# Patient Record
Sex: FEMALE | Employment: FULL TIME | URBAN - METROPOLITAN AREA
[De-identification: names, ages, dates, MRNs, and addresses within clinical notes are randomized per-mention and may not be internally consistent; named-entity substitution may affect disease eponyms.]

---

## 2017-05-30 ENCOUNTER — TELEPHONE (OUTPATIENT)
Dept: INTERNAL MEDICINE CLINIC | Facility: CLINIC | Age: 57
End: 2017-05-30

## 2017-05-30 NOTE — TELEPHONE ENCOUNTER
Patient called had episode this AM of pop in head and dizziness. States subsided after getting up. No further dizziness today, denies and head pain, or vision disturbance. Will call ENT for f/u MRI sooner as has acoustic neuroma.   States feeling fine no

## 2017-05-31 NOTE — TELEPHONE ENCOUNTER
Called patient spoke with Dr. Aguilar Rodriguez will  order for MRI and schedule to see if any changes in acoustic neuroma. She said he was not to startled told her it could be a number of things.     If any severe pain, or visual disturbance or extreme dizzi

## 2017-09-20 ENCOUNTER — OFFICE VISIT (OUTPATIENT)
Dept: INTERNAL MEDICINE CLINIC | Facility: CLINIC | Age: 57
End: 2017-09-20

## 2017-09-20 VITALS
TEMPERATURE: 98 F | HEIGHT: 69 IN | DIASTOLIC BLOOD PRESSURE: 72 MMHG | SYSTOLIC BLOOD PRESSURE: 109 MMHG | HEART RATE: 71 BPM | WEIGHT: 164 LBS | BODY MASS INDEX: 24.29 KG/M2

## 2017-09-20 DIAGNOSIS — Z00.00 ROUTINE GENERAL MEDICAL EXAMINATION AT A HEALTH CARE FACILITY: Primary | ICD-10-CM

## 2017-09-20 DIAGNOSIS — Z12.31 VISIT FOR SCREENING MAMMOGRAM: ICD-10-CM

## 2017-09-20 DIAGNOSIS — D32.1 SPINAL MENINGIOMA (HCC): ICD-10-CM

## 2017-09-20 DIAGNOSIS — D33.3 ACOUSTIC NEUROMA (HCC): ICD-10-CM

## 2017-09-20 LAB
CHOLEST SERPL-MCNC: 260 MG/DL (ref 110–200)
HDLC SERPL-MCNC: 81 MG/DL
LDLC SERPL CALC-MCNC: 151 MG/DL (ref 0–99)
NONHDLC SERPL-MCNC: 179 MG/DL
TRIGL SERPL-MCNC: 139 MG/DL (ref 1–149)

## 2017-09-20 PROCEDURE — 99396 PREV VISIT EST AGE 40-64: CPT | Performed by: INTERNAL MEDICINE

## 2017-09-20 PROCEDURE — 36415 COLL VENOUS BLD VENIPUNCTURE: CPT | Performed by: INTERNAL MEDICINE

## 2017-09-20 RX ORDER — DIAZEPAM 5 MG/1
5 TABLET ORAL
COMMUNITY

## 2017-09-20 RX ORDER — VENLAFAXINE 37.5 MG/1
37.5 TABLET ORAL 2 TIMES DAILY
Qty: 60 TABLET | Refills: 3 | Status: SHIPPED | OUTPATIENT
Start: 2017-09-20 | End: 2017-09-29 | Stop reason: ALTCHOICE

## 2017-09-20 NOTE — PATIENT INSTRUCTIONS
ASSESSMENT AND PLAN:   Angelia Mata is a 56year old female who presents for a complete physical exam. Pap and pelvic done. Self breast exam explained.    Health maintenance: patient is due for pap, mamm, colon   Pt' s weight is Body mass index is 24.22

## 2017-09-20 NOTE — PROGRESS NOTES
HPI:   Florencio Euceda is a 56year old female who presents for a complete physical exam. Symptoms: is menopausal.   Patient complains of needs PE.      Wt Readings from Last 3 Encounters:  09/20/17 : 164 lb (74.4 kg)  08/05/16 : 163 lb (73.9 kg)  10/26/15 throat. Eyes Negative Eye pain and vision changes. Respiratory Negative Cough, dyspnea and wheezing. Cardio Negative Chest pain, claudication, edema and irregular heartbeat/palpitations.    GI Negative Abdominal pain, blood in stool, constipation, ishan Pulses - Dorsalis pedis: Normal. Bruits - Carotids: Absent. Extremity Normal No edema. No varicosities. Abdomen Normal Inspection normal, BS active.  No abdominal tenderness or masses palpable   Genitourinary Normal External genitalia, urethra - Normal

## 2017-09-21 LAB — HPV I/H RISK 1 DNA SPEC QL NAA+PROBE: NEGATIVE

## 2017-09-22 LAB
LAST PAP RESULT: NORMAL
PAP HISTORY (OTHER THAN LAST PAP): NORMAL

## 2017-09-29 ENCOUNTER — OFFICE VISIT (OUTPATIENT)
Dept: DERMATOLOGY CLINIC | Facility: CLINIC | Age: 57
End: 2017-09-29

## 2017-09-29 VITALS — WEIGHT: 163 LBS | BODY MASS INDEX: 24.14 KG/M2 | HEIGHT: 69 IN

## 2017-09-29 DIAGNOSIS — D23.60 BENIGN NEOPLASM OF SKIN OF UPPER LIMB, INCLUDING SHOULDER, UNSPECIFIED LATERALITY: ICD-10-CM

## 2017-09-29 DIAGNOSIS — L82.0 INFLAMED SEBORRHEIC KERATOSIS: ICD-10-CM

## 2017-09-29 DIAGNOSIS — D23.4 BENIGN NEOPLASM OF SCALP AND SKIN OF NECK: ICD-10-CM

## 2017-09-29 DIAGNOSIS — D48.5 NEOPLASM OF UNCERTAIN BEHAVIOR OF SKIN: Primary | ICD-10-CM

## 2017-09-29 DIAGNOSIS — D23.30 BENIGN NEOPLASM OF SKIN OF FACE: ICD-10-CM

## 2017-09-29 DIAGNOSIS — D22.9 MULTIPLE NEVI: ICD-10-CM

## 2017-09-29 DIAGNOSIS — D23.5 BENIGN NEOPLASM OF SKIN OF TRUNK, EXCEPT SCROTUM: ICD-10-CM

## 2017-09-29 PROCEDURE — 88305 TISSUE EXAM BY PATHOLOGIST: CPT | Performed by: DERMATOLOGY

## 2017-09-29 PROCEDURE — 99212 OFFICE O/P EST SF 10 MIN: CPT | Performed by: DERMATOLOGY

## 2017-09-29 PROCEDURE — 11100 BIOPSY OF SKIN LESION: CPT | Performed by: DERMATOLOGY

## 2017-09-29 PROCEDURE — 99213 OFFICE O/P EST LOW 20 MIN: CPT | Performed by: DERMATOLOGY

## 2017-09-29 RX ORDER — DESOXIMETASONE 2.5 MG/G
CREAM TOPICAL
Qty: 60 G | Refills: 3 | Status: SHIPPED | OUTPATIENT
Start: 2017-09-29 | End: 2018-10-01

## 2017-10-03 NOTE — PROGRESS NOTES
9/29/2017 derm path report results log in log book, letter generated as per [employer] and mailed to pt.

## 2017-10-03 NOTE — PROGRESS NOTES
The pathology report from last visit showed   benign, irritated seborrheic keratosis from left preauricular cheek. Please log in test results, send biopsy results letter. Pt to rtc 1 year or prn.

## 2017-10-09 NOTE — PROGRESS NOTES
Silas Riley is a 56year old female. HPI:     CC:  Patient presents with:  Moles: left side of face by ear ,has changed over the last month ,patient denies itch ,pain ,or bleeding,suface is now rough .  No personal or family HX of SC  Psoriasis: Shravan Kidd education: N/A  Number of children: N/A     Occupational History  None on file     Social History Main Topics   Smoking status: Never Smoker    Smokeless tobacco: Never Used    Alcohol use No    Comment: minimal    Drug use: No    Sexual activity: Not on f lesions examined with dermoscopy benign-appearing patterns. Waxy tannish keratotic papules scattered, cherry-red vascular papules scattered. See map today's date for lesions noted .     Minimal psoriasiform patches elbows some postauricularly, papule cherry angiomas:  Reassurance regarding other benign skin lesions. Signs and symptoms of skin cancer, ABCDE's of melanoma discussed with patient. Sunscreen use, sun protection, self exams reviewed.   Followup as noted RTC routine checkup 6 mos - one year or

## 2017-11-11 ENCOUNTER — HOSPITAL ENCOUNTER (OUTPATIENT)
Dept: MAMMOGRAPHY | Facility: HOSPITAL | Age: 57
Discharge: HOME | End: 2017-11-11
Attending: INTERNAL MEDICINE
Payer: COMMERCIAL

## 2017-11-11 DIAGNOSIS — Z12.31 VISIT FOR SCREENING MAMMOGRAM: ICD-10-CM

## 2017-11-11 PROCEDURE — 77067 SCR MAMMO BI INCL CAD: CPT | Performed by: INTERNAL MEDICINE

## 2018-10-02 RX ORDER — DESOXIMETASONE 2.5 MG/G
CREAM TOPICAL
Qty: 60 G | Refills: 0 | Status: SHIPPED | OUTPATIENT
Start: 2018-10-02 | End: 2021-02-11

## 2018-11-07 ENCOUNTER — TELEPHONE (OUTPATIENT)
Dept: INTERNAL MEDICINE CLINIC | Facility: CLINIC | Age: 58
End: 2018-11-07

## 2018-11-07 NOTE — TELEPHONE ENCOUNTER
Patient calling is former patient of Dr José Pillai patient has appointment scheduled for January 31st is requesting orders for labs as needed and mammogram      Please call patient when written.

## 2018-11-07 NOTE — TELEPHONE ENCOUNTER
New patients will be based upon exam.  There might be other things that we need to draw and I do not hesitate to poke twice.   So it is better if she comes in and if there is any other labs that we need to do in addition to regular routine labs would rather

## 2019-01-31 ENCOUNTER — OFFICE VISIT (OUTPATIENT)
Dept: INTERNAL MEDICINE CLINIC | Facility: CLINIC | Age: 59
End: 2019-01-31
Payer: COMMERCIAL

## 2019-01-31 VITALS
WEIGHT: 166 LBS | HEART RATE: 63 BPM | TEMPERATURE: 98 F | HEIGHT: 69 IN | DIASTOLIC BLOOD PRESSURE: 74 MMHG | OXYGEN SATURATION: 98 % | BODY MASS INDEX: 24.59 KG/M2 | SYSTOLIC BLOOD PRESSURE: 114 MMHG

## 2019-01-31 DIAGNOSIS — Z00.00 ANNUAL PHYSICAL EXAM: Primary | ICD-10-CM

## 2019-01-31 DIAGNOSIS — Z12.39 BREAST CANCER SCREENING: ICD-10-CM

## 2019-01-31 DIAGNOSIS — R82.90 ABNORMAL URINE: ICD-10-CM

## 2019-01-31 LAB
BILIRUB UR QL: NEGATIVE
COLOR UR: YELLOW
GLUCOSE UR-MCNC: NEGATIVE MG/DL
KETONES UR-MCNC: NEGATIVE MG/DL
NITRITE UR QL STRIP.AUTO: NEGATIVE
PH UR: 6 [PH] (ref 5–8)
PROT UR-MCNC: NEGATIVE MG/DL
RBC #/AREA URNS AUTO: 1 /HPF
SP GR UR STRIP: 1.01 (ref 1–1.03)
UROBILINOGEN UR STRIP-ACNC: <2
VIT C UR-MCNC: NEGATIVE MG/DL
WBC #/AREA URNS AUTO: 1 /HPF

## 2019-01-31 PROCEDURE — 99396 PREV VISIT EST AGE 40-64: CPT | Performed by: INTERNAL MEDICINE

## 2019-01-31 NOTE — PATIENT INSTRUCTIONS
ASSESSMENT/PLAN:   Annual physical exam  (primary encounter diagnosis) Check urine. Breast cancer screening Check  mammogram. Continue self breast exam every month.      Orders Placed This Encounter      POC Urinalysis, Automated Dip without microscop

## 2019-01-31 NOTE — PROGRESS NOTES
HPI:    Patient ID: Rolly Healy is a 66year old female. Has F/U Dr. Felix Estrada neuroSx. In 7-19 after MRI to be re-done. Rolly Healy is a 66year old female who presents for a complete physical exam. New to me. PCP has left practice.    HPI: BILT 1.1 08/19/2016 08:10 AM    TSH 1.56 08/19/2016 08:10 AM        Lab Results   Component Value Date/Time    CHOLEST 260 (H) 09/20/2017 12:13 PM    HDL 81 09/20/2017 12:13 PM    TRIG 139 09/20/2017 12:13 PM     (H) 09/20/2017 12:13 PM    Snow Caffeine Concern: Yes          Coffee - 3 cups per day         Pt has a pacemaker: No        Pt has a defibrillator: No        Reaction to local anesthetic: No        Hx of Pap: all Paps normal            Review of Systems   Constitutional: Negative. Psychiatric/Behavioral: Negative for agitation, behavioral problems, confusion, decreased concentration, dysphoric mood, hallucinations, self-injury, sleep disturbance and suicidal ideas. The patient is not nervous/anxious and is not hyperactive.     All ot Right Ear: Tympanic membrane, external ear and ear canal normal. No lacerations. No drainage, swelling or tenderness. No foreign bodies. No mastoid tenderness.  Tympanic membrane is not injected, not scarred, not perforated, not erythematous, not retracted Eyes: Conjunctivae, EOM and lids are normal. Pupils are equal, round, and reactive to light. Right eye exhibits no chemosis, no discharge, no exudate and no hordeolum. No foreign body present in the right eye.  Left eye exhibits no chemosis, no discharge, n Abdominal: Soft. Bowel sounds are normal. She exhibits no distension, no fluid wave, no ascites and no mass. There is no hepatosplenomegaly, splenomegaly or hepatomegaly. There is no tenderness.  There is no rigidity, no rebound, no guarding and no CVA tend Requested Prescriptions      No prescriptions requested or ordered in this encounter       Imaging & Referrals:  [facility] SCREENING BILAT (CPT=77067)        [facility]#[id number]

## 2019-02-25 ENCOUNTER — HOSPITAL ENCOUNTER (OUTPATIENT)
Dept: MAMMOGRAPHY | Facility: HOSPITAL | Age: 59
Discharge: HOME | End: 2019-02-25
Attending: INTERNAL MEDICINE
Payer: COMMERCIAL

## 2019-02-25 DIAGNOSIS — Z12.39 BREAST CANCER SCREENING: ICD-10-CM

## 2019-02-25 PROCEDURE — 77067 SCR MAMMO BI INCL CAD: CPT | Performed by: INTERNAL MEDICINE

## 2019-02-25 PROCEDURE — 77063 BREAST TOMOSYNTHESIS BI: CPT | Performed by: INTERNAL MEDICINE

## 2019-03-23 ENCOUNTER — TELEPHONE (OUTPATIENT)
Dept: INTERNAL MEDICINE CLINIC | Facility: CLINIC | Age: 59
End: 2019-03-23

## 2019-03-23 DIAGNOSIS — Z00.00 ADULT GENERAL MEDICAL EXAM: Primary | ICD-10-CM

## 2019-03-23 NOTE — TELEPHONE ENCOUNTER
Patient called requesting orders for labs tests taht she is due for.   Patient went on Saturday and there were no orders in the system

## 2019-03-29 ENCOUNTER — LAB ENCOUNTER (OUTPATIENT)
Dept: LAB | Facility: HOSPITAL | Age: 59
End: 2019-03-29
Attending: INTERNAL MEDICINE
Payer: COMMERCIAL

## 2019-03-29 DIAGNOSIS — Z00.00 ADULT GENERAL MEDICAL EXAM: ICD-10-CM

## 2019-03-29 LAB
ALBUMIN SERPL-MCNC: 4.1 G/DL (ref 3.4–5)
ALBUMIN/GLOB SERPL: 1.2 {RATIO} (ref 1–2)
ALP LIVER SERPL-CCNC: 64 U/L (ref 46–118)
ALT SERPL-CCNC: 25 U/L (ref 13–56)
ANION GAP SERPL CALC-SCNC: 5 MMOL/L (ref 0–18)
AST SERPL-CCNC: 11 U/L (ref 15–37)
BASOPHILS # BLD AUTO: 0.04 X10(3) UL (ref 0–0.2)
BASOPHILS NFR BLD AUTO: 0.8 %
BILIRUB SERPL-MCNC: 0.6 MG/DL (ref 0.1–2)
BUN BLD-MCNC: 13 MG/DL (ref 7–18)
BUN/CREAT SERPL: 18.6 (ref 10–20)
CALCIUM BLD-MCNC: 9.6 MG/DL (ref 8.5–10.1)
CHLORIDE SERPL-SCNC: 109 MMOL/L (ref 98–107)
CHOLEST SMN-MCNC: 250 MG/DL (ref ?–200)
CO2 SERPL-SCNC: 27 MMOL/L (ref 21–32)
CREAT BLD-MCNC: 0.7 MG/DL (ref 0.55–1.02)
DEPRECATED RDW RBC AUTO: 41.2 FL (ref 35.1–46.3)
EOSINOPHIL # BLD AUTO: 0.11 X10(3) UL (ref 0–0.7)
EOSINOPHIL NFR BLD AUTO: 2.3 %
ERYTHROCYTE [DISTWIDTH] IN BLOOD BY AUTOMATED COUNT: 12 % (ref 11–15)
GLOBULIN PLAS-MCNC: 3.4 G/DL (ref 2.8–4.4)
GLUCOSE BLD-MCNC: 99 MG/DL (ref 70–99)
HCT VFR BLD AUTO: 45.7 % (ref 35–48)
HDLC SERPL-MCNC: 77 MG/DL (ref 40–59)
HGB BLD-MCNC: 14.9 G/DL (ref 12–16)
IMM GRANULOCYTES # BLD AUTO: 0.01 X10(3) UL (ref 0–1)
IMM GRANULOCYTES NFR BLD: 0.2 %
LDLC SERPL CALC-MCNC: 157 MG/DL (ref ?–100)
LYMPHOCYTES # BLD AUTO: 1.9 X10(3) UL (ref 1–4)
LYMPHOCYTES NFR BLD AUTO: 39.3 %
M PROTEIN MFR SERPL ELPH: 7.5 G/DL (ref 6.4–8.2)
MCH RBC QN AUTO: 30.5 PG (ref 26–34)
MCHC RBC AUTO-ENTMCNC: 32.6 G/DL (ref 31–37)
MCV RBC AUTO: 93.5 FL (ref 80–100)
MONOCYTES # BLD AUTO: 0.47 X10(3) UL (ref 0.1–1)
MONOCYTES NFR BLD AUTO: 9.7 %
NEUTROPHILS # BLD AUTO: 2.3 X10 (3) UL (ref 1.5–7.7)
NEUTROPHILS # BLD AUTO: 2.3 X10(3) UL (ref 1.5–7.7)
NEUTROPHILS NFR BLD AUTO: 47.7 %
NONHDLC SERPL-MCNC: 173 MG/DL (ref ?–130)
OSMOLALITY SERPL CALC.SUM OF ELEC: 292 MOSM/KG (ref 275–295)
PLATELET # BLD AUTO: 357 10(3)UL (ref 150–450)
POTASSIUM SERPL-SCNC: 4.5 MMOL/L (ref 3.5–5.1)
RBC # BLD AUTO: 4.89 X10(6)UL (ref 3.8–5.3)
SODIUM SERPL-SCNC: 141 MMOL/L (ref 136–145)
TRIGL SERPL-MCNC: 81 MG/DL (ref 30–149)
TSI SER-ACNC: 2.16 MIU/ML (ref 0.36–3.74)
VLDLC SERPL CALC-MCNC: 16 MG/DL (ref 0–30)
WBC # BLD AUTO: 4.8 X10(3) UL (ref 4–11)

## 2019-03-29 PROCEDURE — 80061 LIPID PANEL: CPT

## 2019-03-29 PROCEDURE — 36415 COLL VENOUS BLD VENIPUNCTURE: CPT

## 2019-03-29 PROCEDURE — 80053 COMPREHEN METABOLIC PANEL: CPT

## 2019-03-29 PROCEDURE — 85025 COMPLETE CBC W/AUTO DIFF WBC: CPT

## 2019-03-29 PROCEDURE — 84443 ASSAY THYROID STIM HORMONE: CPT

## 2019-03-31 PROBLEM — E78.00 HIGH CHOLESTEROL: Status: ACTIVE | Noted: 2019-03-31

## 2019-11-20 ENCOUNTER — OFFICE VISIT (OUTPATIENT)
Dept: OPHTHALMOLOGY | Facility: CLINIC | Age: 59
End: 2019-11-20
Payer: COMMERCIAL

## 2019-11-20 DIAGNOSIS — H43.393 FLOATER, VITREOUS, BILATERAL: ICD-10-CM

## 2019-11-20 DIAGNOSIS — H20.811: Primary | ICD-10-CM

## 2019-11-20 DIAGNOSIS — H25.13 AGE-RELATED NUCLEAR CATARACT OF BOTH EYES: ICD-10-CM

## 2019-11-20 PROCEDURE — 92004 COMPRE OPH EXAM NEW PT 1/>: CPT | Performed by: OPHTHALMOLOGY

## 2019-11-20 PROCEDURE — 92015 DETERMINE REFRACTIVE STATE: CPT | Performed by: OPHTHALMOLOGY

## 2019-11-20 NOTE — ASSESSMENT & PLAN NOTE
Right eye much greater than left eye. There is no evidence of retinal pathology. All signs and symptoms of retinal detachment/tears explained in detail.     Patient instructed to call the office if they experience increase in floaters, increase in flashe

## 2019-11-20 NOTE — ASSESSMENT & PLAN NOTE
Discussed cataract in the right eye. Glasses improve vision to 20/20- in the right eye. No surgery is indicated at this time, but will follow yearly. RX for separate distance, reading or progressive glasses per patient's choice.

## 2019-11-20 NOTE — PATIENT INSTRUCTIONS
Fuchs' heterochromic iridocyclitis, right  Stable; no treatment. Stressed importance of yearly eye exams. Age-related nuclear cataract of both eyes  Discussed cataract in the right eye. Glasses improve vision to 20/20- in the right eye.   No surgery

## 2019-11-20 NOTE — PROGRESS NOTES
Danielle Grace is a 64year old female. HPI:     HPI     Pt was last seen in 2013 by CALVIN. Pt complains of blurred vision OD at distance and near. She is wearing OTC reading glasses but would like an updated Rx. Last edited by Jose Nayak OCarlos ORTIZ. Visual Acuity (Snellen - Linear)       Right Left    Dist sc 20/50 20/20    Dist ph sc 20/30     Near cc 20/60 20/20          Tonometry ([facility]care, 2:43 PM)       Right Left    Pressure 10 10          Pupils       Pupils    Right PERRL    Left PERRL +0.25 075 20/20- +2.50 20/20    Left -0.25 Sphere  20/20 +2.50 20/20    Type:  Progressive bifocal                 ASSESSMENT/PLAN:     Diagnoses and Plan:     Fuchs' heterochromic iridocyclitis, right  Stable; no treatment.    Stressed importance of yearly

## 2020-02-19 ENCOUNTER — OFFICE VISIT (OUTPATIENT)
Dept: OPHTHALMOLOGY | Facility: CLINIC | Age: 60
End: 2020-02-19
Payer: COMMERCIAL

## 2020-02-19 ENCOUNTER — TELEPHONE (OUTPATIENT)
Dept: OPHTHALMOLOGY | Facility: CLINIC | Age: 60
End: 2020-02-19

## 2020-02-19 ENCOUNTER — TELEPHONE (OUTPATIENT)
Dept: INTERNAL MEDICINE CLINIC | Facility: CLINIC | Age: 60
End: 2020-02-19

## 2020-02-19 DIAGNOSIS — H25.13 AGE-RELATED NUCLEAR CATARACT OF BOTH EYES: ICD-10-CM

## 2020-02-19 DIAGNOSIS — H33.001 MACULA-ON RHEGMATOGENOUS RETINAL DETACHMENT OF RIGHT EYE: Primary | ICD-10-CM

## 2020-02-19 DIAGNOSIS — H20.811: ICD-10-CM

## 2020-02-19 PROBLEM — H33.21 RIGHT RETINAL DETACHMENT: Status: ACTIVE | Noted: 2020-02-19

## 2020-02-19 PROCEDURE — 92014 COMPRE OPH EXAM EST PT 1/>: CPT | Performed by: OPHTHALMOLOGY

## 2020-02-19 NOTE — PATIENT INSTRUCTIONS
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 Faustina Armando for evaluation and treatment. Appointment was scheduled today  with Dr. Juanito Block at    .    Location is the [facility] location- [address] If the retina has torn but has not yet detached from the wall of the eye, treatment can create an adhesive scar around the retinal tear. This is like stapling around a hole in the wallpaper to keep it from coming off the wall.  This can be done with laser t © 2630-8975 [employer] [address]. [address] [facility], [facility] [place]. All rights reserved. This information is not intended as a substitute for professional medical care. Always follow your healthcare professional's instructions.

## 2020-02-19 NOTE — TELEPHONE ENCOUNTER
Linette/ [facility] states patient is scheduled for an emergency surgery tomorrow 02/20/2020 with Dr. Toño Mancera. [facility] is requesting medical clearance from Dr. Jossy Alvarado.     Fax# [phone]

## 2020-02-19 NOTE — TELEPHONE ENCOUNTER
Dr. Mariya Barone is on vacation. I do not know this patient. From reviewing her chart I see that she is not on any medication, she has no history of high blood pressure cholesterol or diabetes.   If she needs urgent surgery , she cannot wait until she comes madina

## 2020-02-19 NOTE — PROGRESS NOTES
Angelia Mata is a 64year old female. HPI:     HPI     Pt called today stating that she has a blind spot in her vision nasally towards the bottom when looking straight ahead since this morning.  Pt states at noon today the blind spot doubled in size w for: Constitutional, Gastrointestinal, Neurological, Skin, Genitourinary, Musculoskeletal, HENT, Endocrine, Cardiovascular, Respiratory, Psychiatric, Allergic/Imm, Heme/Lymph    Last edited by Cathalene Essex, O.T. on 2/19/2020  2:17 PM. (History) Rosemary Llanos at    . Location is the [facility] location- [address] [place] [address].   Phone # [phone] (orange parking lot)   Patient should bring photo ID, insurance cards, they should be aware that their eyes will be

## 2020-02-19 NOTE — ASSESSMENT & PLAN NOTE
Refer to Dr. Srikanth Oliveira for evaluation and treatment. Appointment was scheduled today  with Dr. Nani Gonzales at    . Location is the [place] [address] location- [address] [place] [address].   Phone # [phone] (orange parkin

## 2020-02-20 NOTE — TELEPHONE ENCOUNTER
I called the [facility] and the patient is having surgery soon. They are asking a note with the information below faxed to them. I pended the note with what was written.     Please sign off and have the office staff fax to     Thank y

## 2020-02-27 PROBLEM — H33.21 RIGHT RETINAL DETACHMENT: Status: ACTIVE | Noted: 2020-02-27

## 2020-02-27 PROBLEM — H43.811 VITREOUS DETACHMENT OF RIGHT EYE: Status: ACTIVE | Noted: 2020-02-27

## 2020-03-11 PROBLEM — Z12.39 BREAST CANCER SCREENING: Status: ACTIVE | Noted: 2020-03-11

## 2020-03-11 PROBLEM — Z71.85 VACCINE COUNSELING: Status: ACTIVE | Noted: 2020-03-11

## 2020-03-11 PROBLEM — Z00.00 ADULT GENERAL MEDICAL EXAMINATION: Status: ACTIVE | Noted: 2020-03-11

## 2020-03-12 ENCOUNTER — TELEPHONE (OUTPATIENT)
Dept: INTERNAL MEDICINE CLINIC | Facility: CLINIC | Age: 60
End: 2020-03-12

## 2020-03-12 DIAGNOSIS — Z00.00 ADULT GENERAL MEDICAL EXAMINATION: ICD-10-CM

## 2020-03-12 DIAGNOSIS — E78.00 HIGH CHOLESTEROL: ICD-10-CM

## 2020-03-12 DIAGNOSIS — Z12.39 BREAST CANCER SCREENING: Primary | ICD-10-CM

## 2020-03-14 ENCOUNTER — HOSPITAL ENCOUNTER (OUTPATIENT)
Dept: MAMMOGRAPHY | Facility: HOSPITAL | Age: 60
Discharge: HOME | End: 2020-03-14
Attending: INTERNAL MEDICINE
Payer: COMMERCIAL

## 2020-03-14 ENCOUNTER — APPOINTMENT (OUTPATIENT)
Dept: LAB | Facility: HOSPITAL | Age: 60
End: 2020-03-14
Attending: INTERNAL MEDICINE
Payer: COMMERCIAL

## 2020-03-14 DIAGNOSIS — Z00.00 ADULT GENERAL MEDICAL EXAMINATION: ICD-10-CM

## 2020-03-14 DIAGNOSIS — Z12.39 BREAST CANCER SCREENING: ICD-10-CM

## 2020-03-14 DIAGNOSIS — E78.00 HIGH CHOLESTEROL: ICD-10-CM

## 2020-03-14 LAB
ALBUMIN SERPL-MCNC: 4.1 G/DL (ref 3.4–5)
ALBUMIN/GLOB SERPL: 1.3 {RATIO} (ref 1–2)
ALP LIVER SERPL-CCNC: 66 U/L (ref 46–118)
ALT SERPL-CCNC: 23 U/L (ref 13–56)
ANION GAP SERPL CALC-SCNC: 6 MMOL/L (ref 0–18)
AST SERPL-CCNC: 14 U/L (ref 15–37)
BILIRUB SERPL-MCNC: 0.6 MG/DL (ref 0.1–2)
BUN BLD-MCNC: 14 MG/DL (ref 7–18)
BUN/CREAT SERPL: 19.4 (ref 10–20)
CALCIUM BLD-MCNC: 9.4 MG/DL (ref 8.5–10.1)
CHLORIDE SERPL-SCNC: 107 MMOL/L (ref 98–112)
CHOLEST SMN-MCNC: 239 MG/DL (ref ?–200)
CO2 SERPL-SCNC: 27 MMOL/L (ref 21–32)
CREAT BLD-MCNC: 0.72 MG/DL (ref 0.55–1.02)
GLOBULIN PLAS-MCNC: 3.1 G/DL (ref 2.8–4.4)
GLUCOSE BLD-MCNC: 97 MG/DL (ref 70–99)
HDLC SERPL-MCNC: 80 MG/DL (ref 40–59)
LDLC SERPL CALC-MCNC: 147 MG/DL (ref ?–100)
M PROTEIN MFR SERPL ELPH: 7.2 G/DL (ref 6.4–8.2)
NONHDLC SERPL-MCNC: 159 MG/DL (ref ?–130)
OSMOLALITY SERPL CALC.SUM OF ELEC: 290 MOSM/KG (ref 275–295)
PATIENT FASTING Y/N/NP: YES
PATIENT FASTING Y/N/NP: YES
POTASSIUM SERPL-SCNC: 3.8 MMOL/L (ref 3.5–5.1)
SODIUM SERPL-SCNC: 140 MMOL/L (ref 136–145)
TRIGL SERPL-MCNC: 59 MG/DL (ref 30–149)
VLDLC SERPL CALC-MCNC: 12 MG/DL (ref 0–30)

## 2020-03-14 PROCEDURE — 36415 COLL VENOUS BLD VENIPUNCTURE: CPT

## 2020-03-14 PROCEDURE — 77063 BREAST TOMOSYNTHESIS BI: CPT | Performed by: INTERNAL MEDICINE

## 2020-03-14 PROCEDURE — 77067 SCR MAMMO BI INCL CAD: CPT | Performed by: INTERNAL MEDICINE

## 2020-03-14 PROCEDURE — 80053 COMPREHEN METABOLIC PANEL: CPT

## 2020-03-14 PROCEDURE — 80061 LIPID PANEL: CPT

## 2020-03-18 ENCOUNTER — HOSPITAL ENCOUNTER (OUTPATIENT)
Dept: ULTRASOUND IMAGING | Facility: HOSPITAL | Age: 60
Discharge: HOME | End: 2020-03-18
Attending: INTERNAL MEDICINE
Payer: COMMERCIAL

## 2020-03-18 ENCOUNTER — HOSPITAL ENCOUNTER (OUTPATIENT)
Dept: MAMMOGRAPHY | Facility: HOSPITAL | Age: 60
Discharge: HOME | End: 2020-03-18
Attending: INTERNAL MEDICINE
Payer: COMMERCIAL

## 2020-03-18 DIAGNOSIS — R92.8 ABNORMAL MAMMOGRAM: ICD-10-CM

## 2020-03-18 PROCEDURE — 76642 ULTRASOUND BREAST LIMITED: CPT | Performed by: INTERNAL MEDICINE

## 2020-03-18 PROCEDURE — 77061 BREAST TOMOSYNTHESIS UNI: CPT | Performed by: INTERNAL MEDICINE

## 2020-03-18 PROCEDURE — 77065 DX MAMMO INCL CAD UNI: CPT | Performed by: INTERNAL MEDICINE

## 2020-04-30 PROBLEM — H25.042 POSTERIOR SUBCAPSULAR POLAR AGE-RELATED CATARACT OF LEFT EYE: Status: ACTIVE | Noted: 2020-04-30

## 2020-04-30 PROBLEM — H33.22 LEFT RETINAL DETACHMENT: Status: ACTIVE | Noted: 2020-04-30

## 2020-04-30 PROBLEM — H43.12 VITREOUS HEMORRHAGE OF LEFT EYE (HCC): Status: ACTIVE | Noted: 2020-04-30

## 2020-05-18 ENCOUNTER — MED REC SCAN ONLY (OUTPATIENT)
Dept: INTERNAL MEDICINE CLINIC | Facility: CLINIC | Age: 60
End: 2020-05-18

## 2020-05-19 PROBLEM — H25.041 POSTERIOR SUBCAPSULAR POLAR AGE-RELATED CATARACT OF RIGHT EYE: Status: ACTIVE | Noted: 2020-05-19

## 2020-06-01 ENCOUNTER — TELEPHONE (OUTPATIENT)
Dept: INTERNAL MEDICINE CLINIC | Facility: CLINIC | Age: 60
End: 2020-06-01

## 2020-06-01 NOTE — TELEPHONE ENCOUNTER
Has not seen me for a year. She is scheduled for cataract removal July 9 with Dr. Vidhya Valentine. They want a preop physical.  She has a physical scheduled but for September and for surgery is July 9 she probably will need a preop.

## 2020-06-05 ENCOUNTER — TELEPHONE (OUTPATIENT)
Dept: INTERNAL MEDICINE CLINIC | Facility: CLINIC | Age: 60
End: 2020-06-05

## 2020-06-05 NOTE — TELEPHONE ENCOUNTER
Pt is schedule to have cataract surgery on 6-25-20. Pt made an appointment for her pre op clearance to see Dr. Belinda Hernandez on 6-11-20. Please, see previous encounter.

## 2020-06-08 ENCOUNTER — MED REC SCAN ONLY (OUTPATIENT)
Dept: INTERNAL MEDICINE CLINIC | Facility: CLINIC | Age: 60
End: 2020-06-08

## 2020-06-11 ENCOUNTER — OFFICE VISIT (OUTPATIENT)
Dept: INTERNAL MEDICINE CLINIC | Facility: CLINIC | Age: 60
End: 2020-06-11
Payer: COMMERCIAL

## 2020-06-11 VITALS
RESPIRATION RATE: 17 BRPM | OXYGEN SATURATION: 99 % | SYSTOLIC BLOOD PRESSURE: 122 MMHG | HEART RATE: 67 BPM | TEMPERATURE: 100 F | HEIGHT: 69 IN | DIASTOLIC BLOOD PRESSURE: 80 MMHG | BODY MASS INDEX: 24.14 KG/M2 | WEIGHT: 163 LBS

## 2020-06-11 DIAGNOSIS — Z01.818 PRE-OP EVALUATION: Primary | ICD-10-CM

## 2020-06-11 DIAGNOSIS — H26.9 CATARACT OF RIGHT EYE, UNSPECIFIED CATARACT TYPE: ICD-10-CM

## 2020-06-11 PROCEDURE — 93000 ELECTROCARDIOGRAM COMPLETE: CPT | Performed by: INTERNAL MEDICINE

## 2020-06-11 PROCEDURE — 99214 OFFICE O/P EST MOD 30 MIN: CPT | Performed by: INTERNAL MEDICINE

## 2020-06-11 PROCEDURE — 36415 COLL VENOUS BLD VENIPUNCTURE: CPT | Performed by: INTERNAL MEDICINE

## 2020-06-11 NOTE — PROGRESS NOTES
HPI:    Patient ID: Mar Garcia is a 64year old female.     HPI   Patient comes in today for preop evaluation for right retinal surgery patient otherwise pretty healthy denies any complaints does not take medication, has any chest pain or shortness of Patient is cleared for surgery  Orders Placed This Encounter      CBC W Differential W Platelet [E] Specimen      Basic Metabolic Panel (8) [E]      Meds This Visit:  Requested Prescriptions      No prescriptions requested or ordered in this encounter

## 2020-06-16 ENCOUNTER — LAB REQUISITION (OUTPATIENT)
Dept: LAB | Facility: HOSPITAL | Age: 60
End: 2020-06-16
Payer: COMMERCIAL

## 2020-06-16 DIAGNOSIS — Z20.828 CONTACT WITH AND (SUSPECTED) EXPOSURE TO OTHER VIRAL COMMUNICABLE DISEASES: ICD-10-CM

## 2020-06-19 ENCOUNTER — TELEPHONE (OUTPATIENT)
Dept: INTERNAL MEDICINE CLINIC | Facility: CLINIC | Age: 60
End: 2020-06-19

## 2020-06-19 NOTE — TELEPHONE ENCOUNTER
Patient has retina surgery scheduled for either this upcoming Thursday (06/25) or Next Thursday. They needs labs and ekg sent over to them> Also states the pre op she had for her cataract needs to be amended to say retina surgery.  Please fax information to: [phone]

## 2020-06-22 NOTE — TELEPHONE ENCOUNTER
Patient called and advised that she has surgery this upcoming Thursday (06/25). This will be for her Retina.       The Ophthalmologist states they still have not received the results from her Physical back on 6/11     Please fax information to: [phone]

## 2020-06-23 ENCOUNTER — LAB REQUISITION (OUTPATIENT)
Dept: LAB | Facility: HOSPITAL | Age: 60
End: 2020-06-23
Payer: COMMERCIAL

## 2020-06-23 DIAGNOSIS — Z20.828 CONTACT WITH AND (SUSPECTED) EXPOSURE TO OTHER VIRAL COMMUNICABLE DISEASES: ICD-10-CM

## 2020-06-23 NOTE — TELEPHONE ENCOUNTER
Syl calling from Dr. Liliam Alejo office and they are waiting on the the pre op clearance patient surgery is Thursday June 25 and this is the second time for this request       Fax# [phone]

## 2020-09-28 ENCOUNTER — OFFICE VISIT (OUTPATIENT)
Dept: INTERNAL MEDICINE CLINIC | Facility: CLINIC | Age: 60
End: 2020-09-28
Payer: COMMERCIAL

## 2020-09-28 ENCOUNTER — HOSPITAL ENCOUNTER (OUTPATIENT)
Dept: ULTRASOUND IMAGING | Facility: HOSPITAL | Age: 60
Discharge: HOME | End: 2020-09-28
Attending: INTERNAL MEDICINE
Payer: COMMERCIAL

## 2020-09-28 ENCOUNTER — HOSPITAL ENCOUNTER (OUTPATIENT)
Dept: MAMMOGRAPHY | Facility: HOSPITAL | Age: 60
Discharge: HOME | End: 2020-09-28
Attending: INTERNAL MEDICINE
Payer: COMMERCIAL

## 2020-09-28 VITALS
WEIGHT: 159.38 LBS | HEIGHT: 69 IN | HEART RATE: 66 BPM | DIASTOLIC BLOOD PRESSURE: 75 MMHG | BODY MASS INDEX: 23.6 KG/M2 | RESPIRATION RATE: 18 BRPM | TEMPERATURE: 98 F | SYSTOLIC BLOOD PRESSURE: 120 MMHG | OXYGEN SATURATION: 97 %

## 2020-09-28 DIAGNOSIS — Z12.4 PAP SMEAR FOR CERVICAL CANCER SCREENING: ICD-10-CM

## 2020-09-28 DIAGNOSIS — H43.811 VITREOUS DETACHMENT OF RIGHT EYE: ICD-10-CM

## 2020-09-28 DIAGNOSIS — H25.13 AGE-RELATED NUCLEAR CATARACT OF BOTH EYES: ICD-10-CM

## 2020-09-28 DIAGNOSIS — R92.8 ABNORMAL MAMMOGRAM: ICD-10-CM

## 2020-09-28 DIAGNOSIS — Z00.00 ADULT GENERAL MEDICAL EXAMINATION: ICD-10-CM

## 2020-09-28 DIAGNOSIS — H25.042 POSTERIOR SUBCAPSULAR POLAR AGE-RELATED CATARACT OF LEFT EYE: ICD-10-CM

## 2020-09-28 DIAGNOSIS — R92.8 ABNORMAL MAMMOGRAM: Primary | ICD-10-CM

## 2020-09-28 DIAGNOSIS — H33.001 MACULA-ON RHEGMATOGENOUS RETINAL DETACHMENT OF RIGHT EYE: ICD-10-CM

## 2020-09-28 DIAGNOSIS — H25.041 POSTERIOR SUBCAPSULAR POLAR AGE-RELATED CATARACT OF RIGHT EYE: ICD-10-CM

## 2020-09-28 DIAGNOSIS — E78.00 HIGH CHOLESTEROL: ICD-10-CM

## 2020-09-28 DIAGNOSIS — R31.9 HEMATURIA, UNSPECIFIED TYPE: ICD-10-CM

## 2020-09-28 DIAGNOSIS — H43.393 FLOATER, VITREOUS, BILATERAL: ICD-10-CM

## 2020-09-28 DIAGNOSIS — L40.9 PSORIASIS AND SIMILAR DISORDER: ICD-10-CM

## 2020-09-28 DIAGNOSIS — Z71.85 VACCINE COUNSELING: ICD-10-CM

## 2020-09-28 DIAGNOSIS — L65.9 ALOPECIA: ICD-10-CM

## 2020-09-28 DIAGNOSIS — N93.9 ABNORMAL UTERINE BLEEDING: ICD-10-CM

## 2020-09-28 DIAGNOSIS — D32.1 SPINAL MENINGIOMA (HCC): ICD-10-CM

## 2020-09-28 DIAGNOSIS — Z12.31 ENCOUNTER FOR SCREENING MAMMOGRAM FOR MALIGNANT NEOPLASM OF BREAST: ICD-10-CM

## 2020-09-28 DIAGNOSIS — H33.22 LEFT RETINAL DETACHMENT: ICD-10-CM

## 2020-09-28 DIAGNOSIS — D33.3 ACOUSTIC NEUROMA (HCC): Primary | ICD-10-CM

## 2020-09-28 LAB
APPEARANCE: CLEAR
BACTERIA UR QL AUTO: NEGATIVE /HPF
BILIRUB UR QL: NEGATIVE
CLARITY UR: CLEAR
COLOR UR: YELLOW
GLUCOSE UR-MCNC: NEGATIVE MG/DL
KETONES UR-MCNC: NEGATIVE MG/DL
MULTISTIX LOT#: 1044 NUMERIC
NITRITE UR QL STRIP.AUTO: NEGATIVE
PH UR: 7 [PH] (ref 5–8)
PH, URINE: 7 (ref 4.5–8)
PROT UR-MCNC: NEGATIVE MG/DL
RBC #/AREA URNS AUTO: 1 /HPF
SP GR UR STRIP: 1.01 (ref 1–1.03)
SPECIFIC GRAVITY: 1.01 (ref 1–1.03)
URINE-COLOR: YELLOW
UROBILINOGEN UR STRIP-ACNC: <2
UROBILINOGEN,SEMI-QN: 0.2 MG/DL (ref 0–1.9)
WBC #/AREA URNS AUTO: <1 /HPF

## 2020-09-28 PROCEDURE — 99396 PREV VISIT EST AGE 40-64: CPT | Performed by: INTERNAL MEDICINE

## 2020-09-28 PROCEDURE — 77061 BREAST TOMOSYNTHESIS UNI: CPT | Performed by: INTERNAL MEDICINE

## 2020-09-28 PROCEDURE — 3074F SYST BP LT 130 MM HG: CPT | Performed by: INTERNAL MEDICINE

## 2020-09-28 PROCEDURE — 77065 DX MAMMO INCL CAD UNI: CPT | Performed by: INTERNAL MEDICINE

## 2020-09-28 PROCEDURE — 76642 ULTRASOUND BREAST LIMITED: CPT | Performed by: INTERNAL MEDICINE

## 2020-09-28 PROCEDURE — 81003 URINALYSIS AUTO W/O SCOPE: CPT | Performed by: INTERNAL MEDICINE

## 2020-09-28 PROCEDURE — 99214 OFFICE O/P EST MOD 30 MIN: CPT | Performed by: INTERNAL MEDICINE

## 2020-09-28 PROCEDURE — 3078F DIAST BP <80 MM HG: CPT | Performed by: INTERNAL MEDICINE

## 2020-09-28 PROCEDURE — 3008F BODY MASS INDEX DOCD: CPT | Performed by: INTERNAL MEDICINE

## 2020-09-28 RX ORDER — NYSTATIN 10B UNIT
1 POWDER (EA) MISCELLANEOUS EVERY MORNING
Qty: 1 BOTTLE | Refills: 2 | Status: SHIPPED | OUTPATIENT
Start: 2020-09-28

## 2020-09-28 NOTE — PROGRESS NOTES
HPI:    Patient ID: Kateryna Velasco is a 64year old female.     Kateryna Velasco is a 64year old female who presents for a complete physical exam.   HPI:   Patient presents with:  Physical: annual exam and papsmear       Patient's last menstrual period was • Desoximetasone 0.25 % External Cream APPLY EXTERNALLY TO THE AFFECTED AREA TWICE DAILY AS NEEDED FOR PSORIASIS (Patient not taking: Reported on 3/12/2020 ) 60 g 0   • diazepam 5 MG Oral Tab Take 5 mg by mouth.      • Multiple Vitamins-Minerals (MULTI-JANUARY Pt has a defibrillator: No        Reaction to local anesthetic: No        OB History     T0    L0    SAB0  TAB0  Ectopic0  Multiple0  Live Births0      Hx of Pap: all Paps normal          Exercise level: exercises 2 times a  week (station Cardiovascular: Negative for chest pain, palpitations and leg swelling. Gastrointestinal: Negative for abdominal distention, abdominal pain, anal bleeding, blood in stool, constipation, diarrhea, nausea, rectal pain and vomiting.    Endocrine: Negative fo Pneumatic retinopexy OD (Dr. 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 Kathy Herrera)   • Meibomian gland dysfunction 2005   • Pinguecula 2005      Past Surgical History:   Procedure Laterality Right Ear: Tympanic membrane, external ear and ear canal normal. No lacerations. No drainage, swelling or tenderness. No foreign bodies. No mastoid tenderness.  Tympanic membrane is not injected, not scarred, not perforated, not erythematous, not retracted Cardiovascular: Normal rate, regular rhythm, S1 normal, S2 normal, normal heart sounds, intact distal pulses and normal pulses. Pulses:       Carotid pulses are 2+ on the right side and 2+ on the left side.        Radial pulses are 2+ on the right side an No erythema, tenderness or bleeding in the vagina. No foreign body in the vagina. No signs of injury in the vagina. Genitourinary Comments: Some old blood from cervical os. Musculoskeletal:         General: No edema.    Lymphadenopathy: Left retinal detachment S/P ppvx. GFX laser 2-% SF6 on 2-20-20. S/P pneumatic retinopexy. S/P pneumatic retinopexy done on March 23, 2020. Status post cryo retinopexy on April 9, 2020.     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

## 2020-09-28 NOTE — PATIENT INSTRUCTIONS
ASSESSMENT/PLAN:   Acoustic neuroma (hcc)  (primary encounter diagnosis)Right, 2mm 6/2017    Adult general medical examination Check urine.      Age-related nuclear cataract of both eyes    Encounter for screening mammogram for malignant neoplasm of br

## 2020-10-23 ENCOUNTER — HOSPITAL ENCOUNTER (OUTPATIENT)
Dept: ULTRASOUND IMAGING | Facility: HOSPITAL | Age: 60
Discharge: HOME | End: 2020-10-23
Attending: INTERNAL MEDICINE
Payer: COMMERCIAL

## 2020-10-23 DIAGNOSIS — N93.9 ABNORMAL UTERINE BLEEDING: ICD-10-CM

## 2020-10-23 PROCEDURE — 76856 US EXAM PELVIC COMPLETE: CPT | Performed by: INTERNAL MEDICINE

## 2020-10-23 PROCEDURE — 76830 TRANSVAGINAL US NON-OB: CPT | Performed by: INTERNAL MEDICINE

## 2020-12-02 ENCOUNTER — OFFICE VISIT (OUTPATIENT)
Dept: OBGYN CLINIC | Facility: CLINIC | Age: 60
End: 2020-12-02
Payer: COMMERCIAL

## 2020-12-02 VITALS
DIASTOLIC BLOOD PRESSURE: 78 MMHG | SYSTOLIC BLOOD PRESSURE: 114 MMHG | BODY MASS INDEX: 23.97 KG/M2 | WEIGHT: 161.81 LBS | HEIGHT: 69 IN

## 2020-12-02 DIAGNOSIS — D25.1 INTRAMURAL LEIOMYOMA OF UTERUS: Primary | ICD-10-CM

## 2020-12-02 PROCEDURE — 3078F DIAST BP <80 MM HG: CPT | Performed by: OBSTETRICS & GYNECOLOGY

## 2020-12-02 PROCEDURE — 3074F SYST BP LT 130 MM HG: CPT | Performed by: OBSTETRICS & GYNECOLOGY

## 2020-12-02 PROCEDURE — 3008F BODY MASS INDEX DOCD: CPT | Performed by: OBSTETRICS & GYNECOLOGY

## 2020-12-02 PROCEDURE — 99202 OFFICE O/P NEW SF 15 MIN: CPT | Performed by: OBSTETRICS & GYNECOLOGY

## 2020-12-02 NOTE — PROGRESS NOTES
HPI:    Patient ID: Danielle Grace is a 64year old female. Patient referred by PCP due to fibroid noted with Pelvic U/S. Fibroid is intramural and less than 2 cm. Patient is not experiencing any symptoms of pelvic pain or postmenopausal bleeding.   Talib Bowles requested or ordered in this encounter       Imaging & Referrals:  None       #[id number]

## 2021-02-11 ENCOUNTER — OFFICE VISIT (OUTPATIENT)
Dept: DERMATOLOGY CLINIC | Facility: CLINIC | Age: 61
End: 2021-02-11
Payer: COMMERCIAL

## 2021-02-11 DIAGNOSIS — D23.9 BENIGN NEOPLASM OF SKIN, UNSPECIFIED LOCATION: ICD-10-CM

## 2021-02-11 DIAGNOSIS — L82.1 SEBORRHEIC KERATOSES: Primary | ICD-10-CM

## 2021-02-11 DIAGNOSIS — L40.0 PSORIASIS VULGARIS: ICD-10-CM

## 2021-02-11 PROCEDURE — 99203 OFFICE O/P NEW LOW 30 MIN: CPT | Performed by: DERMATOLOGY

## 2021-02-11 RX ORDER — NYSTATIN 100000 [USP'U]/G
POWDER TOPICAL
COMMUNITY
Start: 2020-09-28

## 2021-02-11 RX ORDER — KETOROLAC TROMETHAMINE 5 MG/ML
SOLUTION OPHTHALMIC
COMMUNITY
Start: 2021-02-08

## 2021-02-11 RX ORDER — DESOXIMETASONE 2.5 MG/G
CREAM TOPICAL
Qty: 60 G | Refills: 5 | Status: SHIPPED | OUTPATIENT
Start: 2021-02-11

## 2021-02-11 RX ORDER — PREDNISOLONE ACETATE 10 MG/ML
SUSPENSION/ DROPS OPHTHALMIC
COMMUNITY
Start: 2021-02-08

## 2021-02-22 NOTE — PROGRESS NOTES
Baldomero Huerta is a 64year old female.   HPI:     CC:  Patient presents with:  Derm Problem: LOV 9/29/2017, Patient states possible bug bite upper right arm, slightly red and itchy,has had about 3 weeks, also would like to psoroasis, having a flare on for Ophthalmic Solution      • prednisoLONE acetate 1 % Ophthalmic Suspension      • Desoximetasone 0.25 % External Cream APPLY EXTERNALLY TO THE AFFECTED AREA TWICE DAILY AS NEEDED FOR PSORIASIS 60 g 5   • NYSTOP 360791 UNIT/GM External Powder APPLY TOPIALLY Activity      Alcohol use: No        Alcohol/week: 0.0 standard drinks        Comment: minimal      Drug use: No      Sexual activity: Not on file    Lifestyle      Physical activity        Days per week: Not on file        Minutes per session: Not on file arm, slightly red and itchy,has had about 3 weeks, also would like to psoroasis, having a flare on forhead and elbows for the past month, using Desoximetasone with relief, also has possible skin tags on left side of neck and spots on upper chest- has had f lesions noted . Minimal psoriasiform patches elbows some postauricularly, papule enlarged 6 mm at left preauricular cheek no other suspicious lesions  Otherwise remarkable for lesions as noted on map.   See details of examination  See Assessment /Plan fo JANICE's of melanoma discussed with patient. Sunscreen use, sun protection, self exams reviewed. Followup as noted RTC routine checkup 6 mos - one year or p.r.n. The patient indicates understanding of these issues and agrees to the plan.   The rosa

## 2021-03-23 ENCOUNTER — MED REC SCAN ONLY (OUTPATIENT)
Dept: INTERNAL MEDICINE CLINIC | Facility: CLINIC | Age: 61
End: 2021-03-23

## 2021-04-05 ENCOUNTER — HOSPITAL ENCOUNTER (OUTPATIENT)
Dept: ULTRASOUND IMAGING | Facility: HOSPITAL | Age: 61
Discharge: HOME | End: 2021-04-05
Attending: INTERNAL MEDICINE
Payer: COMMERCIAL

## 2021-04-05 ENCOUNTER — HOSPITAL ENCOUNTER (OUTPATIENT)
Dept: MAMMOGRAPHY | Facility: HOSPITAL | Age: 61
Discharge: HOME | End: 2021-04-05
Attending: INTERNAL MEDICINE
Payer: COMMERCIAL

## 2021-04-05 DIAGNOSIS — R92.8 ABNORMAL MAMMOGRAM: ICD-10-CM

## 2021-04-05 PROCEDURE — 76642 ULTRASOUND BREAST LIMITED: CPT | Performed by: INTERNAL MEDICINE

## 2021-04-05 PROCEDURE — 77062 BREAST TOMOSYNTHESIS BI: CPT | Performed by: INTERNAL MEDICINE

## 2021-04-05 PROCEDURE — 77066 DX MAMMO INCL CAD BI: CPT | Performed by: INTERNAL MEDICINE

## 2021-04-27 ENCOUNTER — OFFICE VISIT (OUTPATIENT)
Dept: OPHTHALMOLOGY | Facility: CLINIC | Age: 61
End: 2021-04-27
Payer: COMMERCIAL

## 2021-04-27 DIAGNOSIS — Z86.69 HISTORY OF RETINAL DETACHMENT: ICD-10-CM

## 2021-04-27 DIAGNOSIS — H26.491 AFTER CATARACT OF RIGHT EYE NOT OBSCURING VISION: ICD-10-CM

## 2021-04-27 DIAGNOSIS — H25.12 AGE-RELATED NUCLEAR CATARACT OF LEFT EYE: Primary | ICD-10-CM

## 2021-04-27 DIAGNOSIS — Z96.1 PSEUDOPHAKIA, RIGHT EYE: ICD-10-CM

## 2021-04-27 DIAGNOSIS — H20.811: ICD-10-CM

## 2021-04-27 DIAGNOSIS — H35.371 EPIRETINAL MEMBRANE (ERM) OF RIGHT EYE: ICD-10-CM

## 2021-04-27 DIAGNOSIS — H43.392 FLOATERS, LEFT: ICD-10-CM

## 2021-04-27 PROBLEM — H43.811 VITREOUS DETACHMENT OF RIGHT EYE: Status: RESOLVED | Noted: 2020-02-27 | Resolved: 2021-04-27

## 2021-04-27 PROBLEM — H33.21 RIGHT RETINAL DETACHMENT: Status: RESOLVED | Noted: 2020-02-27 | Resolved: 2021-04-27

## 2021-04-27 PROBLEM — H25.042 POSTERIOR SUBCAPSULAR POLAR AGE-RELATED CATARACT OF LEFT EYE: Status: RESOLVED | Noted: 2020-04-30 | Resolved: 2021-04-27

## 2021-04-27 PROBLEM — H33.001 MACULA-ON RHEGMATOGENOUS RETINAL DETACHMENT OF RIGHT EYE: Status: RESOLVED | Noted: 2020-02-19 | Resolved: 2021-04-27

## 2021-04-27 PROBLEM — H43.12 VITREOUS HEMORRHAGE OF LEFT EYE (HCC): Status: RESOLVED | Noted: 2020-04-30 | Resolved: 2021-04-27

## 2021-04-27 PROBLEM — H25.041 POSTERIOR SUBCAPSULAR POLAR AGE-RELATED CATARACT OF RIGHT EYE: Status: RESOLVED | Noted: 2020-05-19 | Resolved: 2021-04-27

## 2021-04-27 PROBLEM — H33.22 LEFT RETINAL DETACHMENT: Status: RESOLVED | Noted: 2020-04-30 | Resolved: 2021-04-27

## 2021-04-27 PROCEDURE — 92014 COMPRE OPH EXAM EST PT 1/>: CPT | Performed by: OPHTHALMOLOGY

## 2021-04-27 PROCEDURE — 92015 DETERMINE REFRACTIVE STATE: CPT | Performed by: OPHTHALMOLOGY

## 2021-04-27 NOTE — PROGRESS NOTES
Urbano Mason is a 63year old female. HPI:     HPI     Patient is here for a complete exam.  Patient was seen by Dr. Alanna Santos on 3/23/21 she will follow-up with him on 5/18/21. She complains of burry vision in the right eye.   Dr Alanna Santos would like ).    Family History   Problem Relation Age of Onset   • Diabetes Paternal Grandmother    • Cancer Paternal Grandmother         Lung.  Non-smoker   • Macular degeneration Neg    • Glaucoma Neg        Social History: Social History    Tobacco Use      Smokin Clear Vitreous floaters          Fundus Exam       Right Left    Disc Good rim, Temporal crescent Good rim    C/D Ratio 0.3 0.3    Macula ERM Normal    Vessels Normal Normal    Periphery 360 SB, retinal tear with surrounding cyro superotemp  Normal in this encounter.       Meds This Visit:  Requested Prescriptions      No prescriptions requested or ordered in this encounter        Follow up instructions:  Return in about 1 year (around 4/27/2022) for Complete eye exam.    4/27/2021  Scribed by: Elizabeth Rodriguez

## 2021-04-27 NOTE — ASSESSMENT & PLAN NOTE
Discussed mild cataract in the left eye that are not affecting vision and are not surgical at this time.

## 2021-04-27 NOTE — PATIENT INSTRUCTIONS
Fuchs' heterochromic iridocyclitis, right  No treatment needed at this time. Pseudophakia, right eye  No treatment. New glasses today; suggest update.      Age-related nuclear cataract of left eye  Discussed mild cataract in the left eye that are not

## 2021-04-27 NOTE — ASSESSMENT & PLAN NOTE
Continue Pred forte and Ketoralac both 4 times a day in the right eye or as directed by Dr. Ellis Huber. Continue to follow up with Dr. Ellis Huber as planned.

## 2021-04-27 NOTE — ASSESSMENT & PLAN NOTE
Patient had a right retinal detachment 2/20/20.    History of PPVX with laser right eye  for retinal detachment and vitreous hemorrhage 2/20/20,  laser for retinal detachment right eye 3/30/20 (Dr. Monae Ferrer), cataract surgery right eye (Dr. Ysabel Das) 6/18/20,

## 2021-07-13 ENCOUNTER — MED REC SCAN ONLY (OUTPATIENT)
Dept: INTERNAL MEDICINE CLINIC | Facility: CLINIC | Age: 61
End: 2021-07-13

## 2021-07-21 ENCOUNTER — HOSPITAL ENCOUNTER (OUTPATIENT)
Dept: MRI IMAGING | Facility: HOSPITAL | Age: 61
Discharge: HOME | End: 2021-07-21
Attending: PHYSICIAN ASSISTANT
Payer: COMMERCIAL

## 2021-07-21 DIAGNOSIS — D32.1 BENIGN TUMOR OF SPINAL MENINGES (HCC): ICD-10-CM

## 2021-07-21 PROCEDURE — 72157 MRI CHEST SPINE W/O & W/DYE: CPT | Performed by: PHYSICIAN ASSISTANT

## 2021-07-21 PROCEDURE — A9575 INJ GADOTERATE MEGLUMI 0.1ML: HCPCS | Performed by: PHYSICIAN ASSISTANT

## 2021-09-13 ENCOUNTER — MED REC SCAN ONLY (OUTPATIENT)
Dept: INTERNAL MEDICINE CLINIC | Facility: CLINIC | Age: 61
End: 2021-09-13

## 2021-09-28 ENCOUNTER — HOSPITAL ENCOUNTER (OUTPATIENT)
Dept: MRI IMAGING | Facility: HOSPITAL | Age: 61
Discharge: HOME | End: 2021-09-28
Attending: OTOLARYNGOLOGY
Payer: COMMERCIAL

## 2021-09-28 DIAGNOSIS — D33.3 BENIGN NEOPLASM OF CRANIAL NERVES (HCC): ICD-10-CM

## 2021-09-28 PROCEDURE — 70553 MRI BRAIN STEM W/O & W/DYE: CPT | Performed by: OTOLARYNGOLOGY

## 2021-09-28 PROCEDURE — A9575 INJ GADOTERATE MEGLUMI 0.1ML: HCPCS | Performed by: OTOLARYNGOLOGY

## 2021-11-17 ENCOUNTER — MED REC SCAN ONLY (OUTPATIENT)
Dept: INTERNAL MEDICINE CLINIC | Facility: CLINIC | Age: 61
End: 2021-11-17

## 2022-03-01 ENCOUNTER — MED REC SCAN ONLY (OUTPATIENT)
Dept: INTERNAL MEDICINE CLINIC | Facility: CLINIC | Age: 62
End: 2022-03-01

## 2022-03-28 ENCOUNTER — OFFICE VISIT (OUTPATIENT)
Dept: INTERNAL MEDICINE CLINIC | Facility: CLINIC | Age: 62
End: 2022-03-28
Payer: COMMERCIAL

## 2022-03-28 VITALS
WEIGHT: 152.81 LBS | HEIGHT: 69 IN | BODY MASS INDEX: 22.63 KG/M2 | SYSTOLIC BLOOD PRESSURE: 110 MMHG | OXYGEN SATURATION: 98 % | DIASTOLIC BLOOD PRESSURE: 62 MMHG | HEART RATE: 68 BPM

## 2022-03-28 DIAGNOSIS — D33.3 ACOUSTIC NEUROMA (HCC): ICD-10-CM

## 2022-03-28 DIAGNOSIS — H25.12 AGE-RELATED NUCLEAR CATARACT OF LEFT EYE: ICD-10-CM

## 2022-03-28 DIAGNOSIS — D32.1 SPINAL MENINGIOMA (HCC): ICD-10-CM

## 2022-03-28 DIAGNOSIS — Z12.31 ENCOUNTER FOR SCREENING MAMMOGRAM FOR MALIGNANT NEOPLASM OF BREAST: ICD-10-CM

## 2022-03-28 DIAGNOSIS — E78.00 HIGH CHOLESTEROL: ICD-10-CM

## 2022-03-28 DIAGNOSIS — Z86.69 HISTORY OF RETINAL DETACHMENT: ICD-10-CM

## 2022-03-28 DIAGNOSIS — Z28.21 INFLUENZA VACCINE REFUSED: ICD-10-CM

## 2022-03-28 DIAGNOSIS — R92.8 ABNORMAL MAMMOGRAM OF BOTH BREASTS: ICD-10-CM

## 2022-03-28 DIAGNOSIS — Z00.00 ADULT GENERAL MEDICAL EXAMINATION: Primary | ICD-10-CM

## 2022-03-28 DIAGNOSIS — H20.811: ICD-10-CM

## 2022-03-28 DIAGNOSIS — L40.9 PSORIASIS AND SIMILAR DISORDER: ICD-10-CM

## 2022-03-28 LAB
ALBUMIN SERPL-MCNC: 4.3 G/DL (ref 3.4–5)
ALBUMIN/GLOB SERPL: 1.4 {RATIO} (ref 1–2)
ALP LIVER SERPL-CCNC: 64 U/L
ALT SERPL-CCNC: 26 U/L
ANION GAP SERPL CALC-SCNC: 6 MMOL/L (ref 0–18)
APPEARANCE: CLEAR
AST SERPL-CCNC: 20 U/L (ref 15–37)
BASOPHILS # BLD AUTO: 0.05 X10(3) UL (ref 0–0.2)
BASOPHILS NFR BLD AUTO: 1 %
BILIRUB SERPL-MCNC: 0.6 MG/DL (ref 0.1–2)
BILIRUB UR QL: NEGATIVE
BILIRUBIN: NEGATIVE
BUN BLD-MCNC: 11 MG/DL (ref 7–18)
BUN/CREAT SERPL: 15.7 (ref 10–20)
CALCIUM BLD-MCNC: 9.1 MG/DL (ref 8.5–10.1)
CHLORIDE SERPL-SCNC: 102 MMOL/L (ref 98–112)
CHOLEST SERPL-MCNC: 312 MG/DL (ref ?–200)
CLARITY UR: CLEAR
CO2 SERPL-SCNC: 30 MMOL/L (ref 21–32)
COLOR UR: YELLOW
CREAT BLD-MCNC: 0.7 MG/DL
DEPRECATED RDW RBC AUTO: 46.3 FL (ref 35.1–46.3)
EOSINOPHIL # BLD AUTO: 0.06 X10(3) UL (ref 0–0.7)
EOSINOPHIL NFR BLD AUTO: 1.2 %
ERYTHROCYTE [DISTWIDTH] IN BLOOD BY AUTOMATED COUNT: 12.9 % (ref 11–15)
FASTING PATIENT LIPID ANSWER: YES
FASTING STATUS PATIENT QL REPORTED: YES
GLOBULIN PLAS-MCNC: 3.1 G/DL (ref 2.8–4.4)
GLUCOSE (URINE DIPSTICK): NEGATIVE MG/DL
GLUCOSE BLD-MCNC: 93 MG/DL (ref 70–99)
GLUCOSE UR-MCNC: NEGATIVE MG/DL
HCT VFR BLD AUTO: 46.8 %
HDLC SERPL-MCNC: 103 MG/DL (ref 40–59)
HGB BLD-MCNC: 14.9 G/DL
HGB UR QL STRIP.AUTO: NEGATIVE
IMM GRANULOCYTES # BLD AUTO: 0.01 X10(3) UL (ref 0–1)
IMM GRANULOCYTES NFR BLD: 0.2 %
KETONES (URINE DIPSTICK): NEGATIVE MG/DL
LDLC SERPL CALC-MCNC: 203 MG/DL (ref ?–100)
LEUKOCYTE ESTERASE UR QL STRIP.AUTO: NEGATIVE
LYMPHOCYTES # BLD AUTO: 1.51 X10(3) UL (ref 1–4)
LYMPHOCYTES NFR BLD AUTO: 29.5 %
MCH RBC QN AUTO: 30.8 PG (ref 26–34)
MCHC RBC AUTO-ENTMCNC: 31.8 G/DL (ref 31–37)
MCV RBC AUTO: 96.9 FL
MONOCYTES # BLD AUTO: 0.35 X10(3) UL (ref 0.1–1)
MONOCYTES NFR BLD AUTO: 6.8 %
MULTISTIX LOT#: ABNORMAL NUMERIC
NEUTROPHILS # BLD AUTO: 3.14 X10 (3) UL (ref 1.5–7.7)
NEUTROPHILS # BLD AUTO: 3.14 X10(3) UL (ref 1.5–7.7)
NEUTROPHILS NFR BLD AUTO: 61.3 %
NITRITE UR QL STRIP.AUTO: NEGATIVE
NITRITE, URINE: NEGATIVE
NONHDLC SERPL-MCNC: 209 MG/DL (ref ?–130)
OSMOLALITY SERPL CALC.SUM OF ELEC: 285 MOSM/KG (ref 275–295)
PH, URINE: 6.5 (ref 4.5–8)
PLATELET # BLD AUTO: 366 10(3)UL (ref 150–450)
POTASSIUM SERPL-SCNC: 4.4 MMOL/L (ref 3.5–5.1)
PROT SERPL-MCNC: 7.4 G/DL (ref 6.4–8.2)
PROT UR-MCNC: NEGATIVE MG/DL
PROTEIN (URINE DIPSTICK): NEGATIVE MG/DL
RBC # BLD AUTO: 4.83 X10(6)UL
SODIUM SERPL-SCNC: 138 MMOL/L (ref 136–145)
SP GR UR STRIP: 1 (ref 1–1.03)
SPECIFIC GRAVITY: 1.01 (ref 1–1.03)
TRIGL SERPL-MCNC: 52 MG/DL (ref 30–149)
TSI SER-ACNC: 1.4 MIU/ML (ref 0.36–3.74)
URINE-COLOR: YELLOW
UROBILINOGEN UR STRIP-ACNC: <2
UROBILINOGEN,SEMI-QN: 0.2 MG/DL (ref 0–1.9)
VIT C UR-MCNC: NEGATIVE MG/DL
VLDLC SERPL CALC-MCNC: 11 MG/DL (ref 0–30)
WBC # BLD AUTO: 5.1 X10(3) UL (ref 4–11)

## 2022-03-28 PROCEDURE — 3074F SYST BP LT 130 MM HG: CPT | Performed by: NURSE PRACTITIONER

## 2022-03-28 PROCEDURE — 3008F BODY MASS INDEX DOCD: CPT | Performed by: NURSE PRACTITIONER

## 2022-03-28 PROCEDURE — 36415 COLL VENOUS BLD VENIPUNCTURE: CPT | Performed by: NURSE PRACTITIONER

## 2022-03-28 PROCEDURE — 99396 PREV VISIT EST AGE 40-64: CPT | Performed by: NURSE PRACTITIONER

## 2022-03-28 PROCEDURE — 3078F DIAST BP <80 MM HG: CPT | Performed by: NURSE PRACTITIONER

## 2022-03-28 PROCEDURE — 81003 URINALYSIS AUTO W/O SCOPE: CPT | Performed by: NURSE PRACTITIONER

## 2022-03-28 NOTE — PATIENT INSTRUCTIONS
ASSESSMENT/PLAN:   Adult general medical examination  (primary encounter diagnosis)  Check urine    High cholesterol  Check blood    Spinal meningioma (hcc) Resection thoracic meningioma March, 2015    Acoustic neuroma (hcc) stable    Psoriasis and similar disorder stable    History of retinal detachment- right eye   Fuchs' heterochromic iridocyclitis, right    Abnormal mammogram of both breasts  Check mammogram. Continue self breast exam every month. Age-related nuclear cataract of left eye   Follow up with eye doctor    Vaccine counseling  Per patient refused shingles vaccine. If interested contact insurance company to find out coverage location. Influenza vaccine refused    Orders Placed This Encounter      URINALYSIS, AUTO, W/O SCOPE      TSH W Reflex To Free T4      Lipid Panel      CBC With Differential With Platelet      Comp Metabolic Panel (14)      Urinalysis with Culture Reflex    Follow-up in 1 year or sooner if needed.   Meds This Visit:  Requested Prescriptions      No prescriptions requested or ordered in this encounter       Imaging & Referrals:  [facility] DIAGNOSTIC BILATERAL (CPT=[id number])  US BREAST BILATERAL COMPLETE (PSH=72475-95)

## 2022-04-05 ENCOUNTER — HOSPITAL ENCOUNTER (OUTPATIENT)
Dept: MAMMOGRAPHY | Facility: HOSPITAL | Age: 62
Discharge: HOME | End: 2022-04-05
Attending: NURSE PRACTITIONER
Payer: COMMERCIAL

## 2022-04-05 ENCOUNTER — HOSPITAL ENCOUNTER (OUTPATIENT)
Dept: ULTRASOUND IMAGING | Facility: HOSPITAL | Age: 62
Discharge: HOME | End: 2022-04-05
Attending: NURSE PRACTITIONER
Payer: COMMERCIAL

## 2022-04-05 DIAGNOSIS — R92.8 ABNORMAL MAMMOGRAM OF BOTH BREASTS: ICD-10-CM

## 2022-04-05 PROCEDURE — 77062 BREAST TOMOSYNTHESIS BI: CPT | Performed by: NURSE PRACTITIONER

## 2022-04-05 PROCEDURE — 76642 ULTRASOUND BREAST LIMITED: CPT | Performed by: NURSE PRACTITIONER

## 2022-04-05 PROCEDURE — 77066 DX MAMMO INCL CAD BI: CPT | Performed by: NURSE PRACTITIONER

## 2022-07-21 ENCOUNTER — HOSPITAL ENCOUNTER (OUTPATIENT)
Dept: GENERAL RADIOLOGY | Facility: HOSPITAL | Age: 62
Discharge: HOME | End: 2022-07-21
Attending: NURSE PRACTITIONER
Payer: COMMERCIAL

## 2022-07-21 ENCOUNTER — OFFICE VISIT (OUTPATIENT)
Dept: INTERNAL MEDICINE CLINIC | Facility: CLINIC | Age: 62
End: 2022-07-21
Payer: COMMERCIAL

## 2022-07-21 VITALS
BODY MASS INDEX: 22.07 KG/M2 | HEIGHT: 69 IN | OXYGEN SATURATION: 99 % | DIASTOLIC BLOOD PRESSURE: 64 MMHG | SYSTOLIC BLOOD PRESSURE: 100 MMHG | HEART RATE: 65 BPM | WEIGHT: 149 LBS

## 2022-07-21 DIAGNOSIS — R22.1 LUMP IN NECK: ICD-10-CM

## 2022-07-21 DIAGNOSIS — R22.1 LUMP IN NECK: Primary | ICD-10-CM

## 2022-07-21 PROCEDURE — 3074F SYST BP LT 130 MM HG: CPT | Performed by: NURSE PRACTITIONER

## 2022-07-21 PROCEDURE — 70360 X-RAY EXAM OF NECK: CPT | Performed by: NURSE PRACTITIONER

## 2022-07-21 PROCEDURE — 3008F BODY MASS INDEX DOCD: CPT | Performed by: NURSE PRACTITIONER

## 2022-07-21 PROCEDURE — 3078F DIAST BP <80 MM HG: CPT | Performed by: NURSE PRACTITIONER

## 2022-07-21 PROCEDURE — 99213 OFFICE O/P EST LOW 20 MIN: CPT | Performed by: NURSE PRACTITIONER

## 2022-07-21 NOTE — PATIENT INSTRUCTIONS
ASSESSMENT/PLAN:   Lump in neck  (primary encounter diagnosis)  Ordered xray    No orders of the defined types were placed in this encounter.       Meds This Visit:  Requested Prescriptions      No prescriptions requested or ordered in this encounter       Imaging & Referrals:  XR SOFT TISSUE NECK (CPT=70360)

## 2022-07-22 ENCOUNTER — TELEPHONE (OUTPATIENT)
Dept: INTERNAL MEDICINE CLINIC | Facility: CLINIC | Age: 62
End: 2022-07-22

## 2022-07-28 ENCOUNTER — MED REC SCAN ONLY (OUTPATIENT)
Dept: INTERNAL MEDICINE CLINIC | Facility: CLINIC | Age: 62
End: 2022-07-28

## 2022-07-29 ENCOUNTER — HOSPITAL ENCOUNTER (OUTPATIENT)
Dept: CT IMAGING | Age: 62
Discharge: HOME | End: 2022-07-29
Attending: NURSE PRACTITIONER
Payer: COMMERCIAL

## 2022-07-29 DIAGNOSIS — R22.1 LUMP IN NECK: ICD-10-CM

## 2022-07-29 PROCEDURE — 70490 CT SOFT TISSUE NECK W/O DYE: CPT | Performed by: NURSE PRACTITIONER

## 2022-08-03 ENCOUNTER — HOSPITAL ENCOUNTER (OUTPATIENT)
Dept: ULTRASOUND IMAGING | Age: 62
Discharge: HOME | End: 2022-08-03
Attending: NURSE PRACTITIONER
Payer: COMMERCIAL

## 2022-08-03 DIAGNOSIS — R22.1 LUMP IN NECK: ICD-10-CM

## 2022-08-03 PROCEDURE — 76536 US EXAM OF HEAD AND NECK: CPT | Performed by: NURSE PRACTITIONER

## 2022-08-26 ENCOUNTER — HOSPITAL ENCOUNTER (OUTPATIENT)
Dept: MRI IMAGING | Age: 62
Discharge: HOME | End: 2022-08-26
Attending: NURSE PRACTITIONER
Payer: COMMERCIAL

## 2022-08-26 DIAGNOSIS — R22.1 LUMP IN NECK: ICD-10-CM

## 2022-08-26 PROCEDURE — A9575 INJ GADOTERATE MEGLUMI 0.1ML: HCPCS | Performed by: NURSE PRACTITIONER

## 2022-08-26 PROCEDURE — 70543 MRI ORBT/FAC/NCK W/O &W/DYE: CPT | Performed by: NURSE PRACTITIONER

## 2022-09-19 ENCOUNTER — MED REC SCAN ONLY (OUTPATIENT)
Dept: INTERNAL MEDICINE CLINIC | Facility: CLINIC | Age: 62
End: 2022-09-19

## 2022-10-25 ENCOUNTER — OFFICE VISIT (OUTPATIENT)
Dept: OPHTHALMOLOGY | Facility: CLINIC | Age: 62
End: 2022-10-25
Payer: COMMERCIAL

## 2022-10-25 DIAGNOSIS — H25.12 AGE-RELATED NUCLEAR CATARACT OF LEFT EYE: Primary | ICD-10-CM

## 2022-10-25 DIAGNOSIS — Z96.1 PSEUDOPHAKIA, RIGHT EYE: ICD-10-CM

## 2022-10-25 DIAGNOSIS — H26.491 AFTER CATARACT OF RIGHT EYE NOT OBSCURING VISION: ICD-10-CM

## 2022-10-25 DIAGNOSIS — H43.392 FLOATERS, LEFT: ICD-10-CM

## 2022-10-25 DIAGNOSIS — H20.811: ICD-10-CM

## 2022-10-25 DIAGNOSIS — Z86.69 HISTORY OF RETINAL DETACHMENT: ICD-10-CM

## 2022-10-25 DIAGNOSIS — H35.371 EPIRETINAL MEMBRANE (ERM) OF RIGHT EYE: ICD-10-CM

## 2022-10-25 PROCEDURE — 92015 DETERMINE REFRACTIVE STATE: CPT | Performed by: OPHTHALMOLOGY

## 2022-10-25 PROCEDURE — 92014 COMPRE OPH EXAM EST PT 1/>: CPT | Performed by: OPHTHALMOLOGY

## 2022-10-25 NOTE — PATIENT INSTRUCTIONS
Age-related nuclear cataract of left eye  Discussed mild cataract in the left eye that is not affecting vision and is not surgical at this time. After cataract of right eye not obscuring vision  No treatment is needed at this time. Told patient to call to schedule a YAG laser if she notes blurry vision in the right eye. Pseudophakia, right eye  No treatment. New glasses today; suggest update as needed. History of retinal detachment- Right eye   Patient had a right retinal detachment 2/20/20. History of PPVX with laser right eye  for retinal detachment and vitreous hemorrhage 2/20/20,  laser for retinal detachment right eye 3/30/20 (Dr. Vera Lawrence), cataract surgery right eye (Dr. Kimber Fuentes) 6/18/20, scleral buckle right eye (Dr. Vera Lawrence) 6/25/20    Floaters, left  No treatment. Epiretinal membrane (ERM) of right eye  Stable; no treatment. Follow up with Dr. Vera Lawrence in 1 year as directed. Fuchs' heterochromic iridocyclitis, right  No treatment is needed at this time.

## 2022-10-25 NOTE — ASSESSMENT & PLAN NOTE
No treatment is needed at this time. Told patient to call to schedule a YAG laser if she notes blurry vision in the right eye.

## 2022-10-25 NOTE — ASSESSMENT & PLAN NOTE
Discussed mild cataract in the left eye that is not affecting vision and is not surgical at this time.

## 2022-10-25 NOTE — ASSESSMENT & PLAN NOTE
Patient had a right retinal detachment 2/20/20.    History of PPVX with laser right eye  for retinal detachment and vitreous hemorrhage 2/20/20,  laser for retinal detachment right eye 3/30/20 (Dr. Lasha Teran), cataract surgery right eye (Dr. Chelo Love) 6/18/20, scleral buckle right eye (Dr. Lasha Teran) 6/25/20

## 2023-04-03 ENCOUNTER — OFFICE VISIT (OUTPATIENT)
Dept: INTERNAL MEDICINE CLINIC | Facility: CLINIC | Age: 63
End: 2023-04-03

## 2023-04-03 VITALS
DIASTOLIC BLOOD PRESSURE: 64 MMHG | HEART RATE: 70 BPM | SYSTOLIC BLOOD PRESSURE: 100 MMHG | BODY MASS INDEX: 22.39 KG/M2 | WEIGHT: 151.19 LBS | OXYGEN SATURATION: 99 % | HEIGHT: 69 IN

## 2023-04-03 DIAGNOSIS — D33.3 ACOUSTIC NEUROMA (HCC): ICD-10-CM

## 2023-04-03 DIAGNOSIS — E78.00 HIGH CHOLESTEROL: ICD-10-CM

## 2023-04-03 DIAGNOSIS — Z71.85 VACCINE COUNSELING: ICD-10-CM

## 2023-04-03 DIAGNOSIS — M54.30 SCIATICA, UNSPECIFIED LATERALITY: ICD-10-CM

## 2023-04-03 DIAGNOSIS — Z86.69 HISTORY OF RETINAL DETACHMENT: ICD-10-CM

## 2023-04-03 DIAGNOSIS — L40.9 PSORIASIS AND SIMILAR DISORDER: ICD-10-CM

## 2023-04-03 DIAGNOSIS — D32.1 SPINAL MENINGIOMA (HCC): ICD-10-CM

## 2023-04-03 DIAGNOSIS — Z12.31 ENCOUNTER FOR SCREENING MAMMOGRAM FOR MALIGNANT NEOPLASM OF BREAST: ICD-10-CM

## 2023-04-03 DIAGNOSIS — Z00.00 ADULT GENERAL MEDICAL EXAMINATION: Primary | ICD-10-CM

## 2023-04-03 LAB
BILIRUBIN: NEGATIVE
GLUCOSE (URINE DIPSTICK): NEGATIVE MG/DL
KETONES (URINE DIPSTICK): NEGATIVE MG/DL
MULTISTIX LOT#: ABNORMAL NUMERIC
NITRITE, URINE: NEGATIVE
PH, URINE: 7 (ref 4.5–8)
PROTEIN (URINE DIPSTICK): NEGATIVE MG/DL
SPECIFIC GRAVITY: 1.01 (ref 1–1.03)
URINE-COLOR: YELLOW
UROBILINOGEN,SEMI-QN: 0.2 MG/DL (ref 0–1.9)

## 2023-04-03 PROCEDURE — 3008F BODY MASS INDEX DOCD: CPT | Performed by: NURSE PRACTITIONER

## 2023-04-03 PROCEDURE — 90715 TDAP VACCINE 7 YRS/> IM: CPT | Performed by: NURSE PRACTITIONER

## 2023-04-03 PROCEDURE — 81003 URINALYSIS AUTO W/O SCOPE: CPT | Performed by: NURSE PRACTITIONER

## 2023-04-03 PROCEDURE — 90471 IMMUNIZATION ADMIN: CPT | Performed by: NURSE PRACTITIONER

## 2023-04-03 PROCEDURE — 36415 COLL VENOUS BLD VENIPUNCTURE: CPT | Performed by: NURSE PRACTITIONER

## 2023-04-03 PROCEDURE — 3078F DIAST BP <80 MM HG: CPT | Performed by: NURSE PRACTITIONER

## 2023-04-03 PROCEDURE — 3074F SYST BP LT 130 MM HG: CPT | Performed by: NURSE PRACTITIONER

## 2023-04-03 PROCEDURE — 99396 PREV VISIT EST AGE 40-64: CPT | Performed by: NURSE PRACTITIONER

## 2023-04-03 NOTE — PATIENT INSTRUCTIONS
ASSESSMENT/PLAN:   Adult general medical examination  (primary encounter diagnosis)  Check urine  Check blood    High cholesterol  Check blood    Sciatica, unspecified laterality stable    Vaccine counseling  Tdap given during todays office visit    Encounter for screening mammogram for malignant neoplasm of breast  Mammogram ordered-call to schedule    Spinal meningioma (hcc)  History of retinal detachment- right eye   Follow up with eye doctor    Psoriasis and similar disorder  Stable    Acoustic neuroma (hcc)    Orders Placed This Encounter      URINALYSIS, AUTO, W/O SCOPE      TSH W Reflex To Free T4      Lipid Panel      CBC With Differential With Platelet      Comp Metabolic Panel (14)      Urinalysis with Culture Reflex      TETANUS, DIPHTHERIA TOXOIDS AND ACELLULAR PERTUSIS VACCINE (TDAP), >7 YEARS, IM USE    Follow up in 1 year or sooner if needed  Meds This Visit:  Requested Prescriptions      No prescriptions requested or ordered in this encounter       Imaging & Referrals:  TETANUS, DIPHTHERIA TOXOIDS AND ACELLULAR PERTUSIS VACCINE (TDAP), >7 YEARS, IM USE  [facility] SCREENING BILAT (CPT=[id number])

## 2023-04-04 LAB
ALBUMIN SERPL-MCNC: 3.8 G/DL (ref 3.4–5)
ALBUMIN/GLOB SERPL: 1.2 {RATIO} (ref 1–2)
ALP LIVER SERPL-CCNC: 54 U/L
ALT SERPL-CCNC: 30 U/L
ANION GAP SERPL CALC-SCNC: 7 MMOL/L (ref 0–18)
AST SERPL-CCNC: 19 U/L (ref 15–37)
BASOPHILS # BLD AUTO: 0.04 X10(3) UL (ref 0–0.2)
BASOPHILS NFR BLD AUTO: 1.1 %
BILIRUB SERPL-MCNC: 0.6 MG/DL (ref 0.1–2)
BILIRUB UR QL: NEGATIVE
BUN BLD-MCNC: 12 MG/DL (ref 7–18)
BUN/CREAT SERPL: 15.8 (ref 10–20)
CALCIUM BLD-MCNC: 8.8 MG/DL (ref 8.5–10.1)
CHLORIDE SERPL-SCNC: 105 MMOL/L (ref 98–112)
CHOLEST SERPL-MCNC: 245 MG/DL (ref ?–200)
CLARITY UR: CLEAR
CO2 SERPL-SCNC: 26 MMOL/L (ref 21–32)
COLOR UR: YELLOW
CREAT BLD-MCNC: 0.76 MG/DL
DEPRECATED RDW RBC AUTO: 46.5 FL (ref 35.1–46.3)
EOSINOPHIL # BLD AUTO: 0.07 X10(3) UL (ref 0–0.7)
EOSINOPHIL NFR BLD AUTO: 1.9 %
ERYTHROCYTE [DISTWIDTH] IN BLOOD BY AUTOMATED COUNT: 13 % (ref 11–15)
FASTING PATIENT LIPID ANSWER: YES
FASTING STATUS PATIENT QL REPORTED: YES
GFR SERPLBLD BASED ON 1.73 SQ M-ARVRAT: 89 ML/MIN/1.73M2 (ref 60–?)
GLOBULIN PLAS-MCNC: 3.2 G/DL (ref 2.8–4.4)
GLUCOSE BLD-MCNC: 74 MG/DL (ref 70–99)
GLUCOSE UR-MCNC: NEGATIVE MG/DL
HCT VFR BLD AUTO: 41.9 %
HDLC SERPL-MCNC: 93 MG/DL (ref 40–59)
HGB BLD-MCNC: 13.7 G/DL
IMM GRANULOCYTES # BLD AUTO: 0 X10(3) UL (ref 0–1)
IMM GRANULOCYTES NFR BLD: 0 %
KETONES UR-MCNC: NEGATIVE MG/DL
LDLC SERPL CALC-MCNC: 146 MG/DL (ref ?–100)
LYMPHOCYTES # BLD AUTO: 1.42 X10(3) UL (ref 1–4)
LYMPHOCYTES NFR BLD AUTO: 38.4 %
MCH RBC QN AUTO: 31.3 PG (ref 26–34)
MCHC RBC AUTO-ENTMCNC: 32.7 G/DL (ref 31–37)
MCV RBC AUTO: 95.7 FL
MONOCYTES # BLD AUTO: 0.27 X10(3) UL (ref 0.1–1)
MONOCYTES NFR BLD AUTO: 7.3 %
NEUTROPHILS # BLD AUTO: 1.9 X10 (3) UL (ref 1.5–7.7)
NEUTROPHILS # BLD AUTO: 1.9 X10(3) UL (ref 1.5–7.7)
NEUTROPHILS NFR BLD AUTO: 51.3 %
NITRITE UR QL STRIP.AUTO: NEGATIVE
NONHDLC SERPL-MCNC: 152 MG/DL (ref ?–130)
OSMOLALITY SERPL CALC.SUM OF ELEC: 284 MOSM/KG (ref 275–295)
PH UR: 6 [PH] (ref 5–8)
PLATELET # BLD AUTO: 321 10(3)UL (ref 150–450)
POTASSIUM SERPL-SCNC: 4 MMOL/L (ref 3.5–5.1)
PROT SERPL-MCNC: 7 G/DL (ref 6.4–8.2)
PROT UR-MCNC: NEGATIVE MG/DL
RBC # BLD AUTO: 4.38 X10(6)UL
SODIUM SERPL-SCNC: 138 MMOL/L (ref 136–145)
SP GR UR STRIP: 1.01 (ref 1–1.03)
TRIGL SERPL-MCNC: 41 MG/DL (ref 30–149)
TSI SER-ACNC: 0.99 MIU/ML (ref 0.36–3.74)
UROBILINOGEN UR STRIP-ACNC: <2
VIT C UR-MCNC: NEGATIVE MG/DL
VLDLC SERPL CALC-MCNC: 8 MG/DL (ref 0–30)
WBC # BLD AUTO: 3.7 X10(3) UL (ref 4–11)

## 2023-04-29 ENCOUNTER — HOSPITAL ENCOUNTER (OUTPATIENT)
Dept: MAMMOGRAPHY | Facility: HOSPITAL | Age: 63
Discharge: HOME | End: 2023-04-29
Attending: NURSE PRACTITIONER
Payer: COMMERCIAL

## 2023-04-29 DIAGNOSIS — Z12.31 ENCOUNTER FOR SCREENING MAMMOGRAM FOR MALIGNANT NEOPLASM OF BREAST: ICD-10-CM

## 2023-04-29 PROCEDURE — 77063 BREAST TOMOSYNTHESIS BI: CPT | Performed by: NURSE PRACTITIONER

## 2023-04-29 PROCEDURE — 77067 SCR MAMMO BI INCL CAD: CPT | Performed by: NURSE PRACTITIONER

## 2023-05-12 ENCOUNTER — HOSPITAL ENCOUNTER (OUTPATIENT)
Dept: ULTRASOUND IMAGING | Age: 63
End: 2023-05-12
Attending: PHYSICIAN ASSISTANT
Payer: COMMERCIAL

## 2023-05-12 ENCOUNTER — HOSPITAL ENCOUNTER (OUTPATIENT)
Dept: ULTRASOUND IMAGING | Age: 63
Discharge: HOME | End: 2023-05-12
Attending: PHYSICIAN ASSISTANT
Payer: COMMERCIAL

## 2023-05-12 DIAGNOSIS — D21.0: ICD-10-CM

## 2023-05-12 PROCEDURE — 76536 US EXAM OF HEAD AND NECK: CPT | Performed by: PHYSICIAN ASSISTANT

## 2023-06-12 ENCOUNTER — HOSPITAL ENCOUNTER (OUTPATIENT)
Dept: GENERAL RADIOLOGY | Facility: HOSPITAL | Age: 63
Discharge: HOME | End: 2023-06-12
Attending: ORTHOPAEDIC SURGERY
Payer: COMMERCIAL

## 2023-06-12 ENCOUNTER — OFFICE VISIT (OUTPATIENT)
Dept: ORTHOPEDICS CLINIC | Facility: CLINIC | Age: 63
End: 2023-06-12

## 2023-06-12 DIAGNOSIS — M25.552 PAIN OF LEFT HIP: Primary | ICD-10-CM

## 2023-06-12 DIAGNOSIS — M25.552 PAIN OF LEFT HIP: ICD-10-CM

## 2023-06-12 DIAGNOSIS — M16.12 PRIMARY OSTEOARTHRITIS OF LEFT HIP: ICD-10-CM

## 2023-06-12 PROCEDURE — 73502 X-RAY EXAM HIP UNI 2-3 VIEWS: CPT | Performed by: ORTHOPAEDIC SURGERY

## 2023-06-12 RX ORDER — MELOXICAM 15 MG/1
15 TABLET ORAL DAILY
Qty: 30 TABLET | Refills: 0 | Status: SHIPPED | OUTPATIENT
Start: 2023-06-12

## 2023-06-13 PROBLEM — M16.12 PRIMARY OSTEOARTHRITIS OF LEFT HIP: Status: ACTIVE | Noted: 2023-06-13

## 2023-06-16 ENCOUNTER — TELEPHONE (OUTPATIENT)
Dept: PHYSICAL THERAPY | Facility: HOSPITAL | Age: 63
End: 2023-06-16

## 2023-06-19 ENCOUNTER — OFFICE VISIT (OUTPATIENT)
Dept: PHYSICAL THERAPY | Facility: HOSPITAL | Age: 63
End: 2023-06-19
Attending: ORTHOPAEDIC SURGERY
Payer: COMMERCIAL

## 2023-06-19 DIAGNOSIS — M16.12 PRIMARY OSTEOARTHRITIS OF LEFT HIP: ICD-10-CM

## 2023-06-19 PROCEDURE — 97161 PT EVAL LOW COMPLEX 20 MIN: CPT

## 2023-06-19 PROCEDURE — 97110 THERAPEUTIC EXERCISES: CPT

## 2023-06-22 ENCOUNTER — OFFICE VISIT (OUTPATIENT)
Dept: PHYSICAL THERAPY | Facility: HOSPITAL | Age: 63
End: 2023-06-22
Attending: ORTHOPAEDIC SURGERY
Payer: COMMERCIAL

## 2023-06-22 PROCEDURE — 97110 THERAPEUTIC EXERCISES: CPT

## 2023-06-22 PROCEDURE — 97140 MANUAL THERAPY 1/> REGIONS: CPT

## 2023-06-25 NOTE — PROGRESS NOTES
Diagnosis:   Primary osteoarthritis of left hip (V33.08         Referring Provider: Travis Salinas  Date of Evaluation:    6/19/2023    Precautions:  None Next MD visit:   none scheduled  Date of Surgery: n/a     Insurance Primary/Secondary: [employer] [place] P[place] / N/A     # Auth Visits: no limit            Subjective: Pt reports no  L hip pain  now. Reports no pain on Saturday until donning L  shoe (demos standing hip flex/ER) in the evening to go for a walk. Reports the pain improved while walking and resolved by bed time. Reports doing yard work on Sunday for 3 hours and L hip started hurting after 2 hours. Pt demos kneeling and leaning forward into end range hip flexion. Reports taking aleve that day and it improved. Pain: L hip pain:  0/10 pre and post Rx today     IE  Pt describes pain level current 0/10, at best 0/10, at worst 7/10. Pain present about 60-70% of the time. Average pain level 4/10;    Current functional limitations include: doing grocery shopping, (only 1x recently), doing car transfers, flexing L LE up, sidestepping especially with flexed knees, figure 4 position to don shoes/socks. Misc. Pain better with: meloxicam   Night pain: was waking up with pain 4-5x/night;  not now since taking meloxicam.   Sleep position: L side with L leg flexed up; Reports this feels good. Day pattern of pain: pain starts around noon, and is worse by evening. Occupation: desk job,   Living situation: house with stairs, no problem doing stairs  Activity Level: doing stairs at home, walking 40 min  5x/wk during lunch at work and then walking again at night for 30-40 minutes; Walks on weekends walks for a couple hours and is able to walk up/down hill without increasing symptoms.       Objective:     6/26/23  Posture:  kyphotic-lordotic posture  Gait: pt ambulates on level ground with thania hip AGMR and lumbar ext /rot syndrome    Positional fault: L posterior iliac rotation     AROM: (* denotes performed with pain)  Hip Flexion: R wnl; L 105 inc 110 post Rx   Abduction: R wnl; L 30 erp  ER: R wnl; L 30 erp  IR: R wnl; L 10 erp      Strength/MMT: (* denotes performed with pain)  Hip   Flexion: R 5/5; L 4/5 some pain   Abduction (GMP)  R 3-/5; L 2+/5  ER: R 4+/5; L 4/5  IR: R 5/5; L 5/5      Accessory motion:  Hip jt restricted long axis distraction, lateral distraction, inferior glide, post glide    Capsular pattern of the hip noted. Assessment: Continues with capsular pattern of the L hip. Goals: (to be met in 8-10 visits)    Pt will improve L hip flexion by 5-10 deg to promote ease of car/tub transfers. Pt will demo improved ease of donning/doffing shoes. Pt will be able to walk 30-40 minutes for grocery shopping without  discomfort. Pt will improve muscle performance of the L iliopsoas for functional gait and stairs. Pt will improve muscle performance of B PGM, (L>R) to promote improved ease of walking, doing stairs, and side stepping for work outs. Pt will improve strength of Glut max to aid with sit to stand transfers and push off phase of gait. Pt will report less pain at night with sleeping. Pt will be indep with HEP for self management. Plan: Plan to continue skilled PT to address jt restrictions, restore ROM, and progress with strengthening to achieve goals as outlined and to promote functional ADL. Next visit progress to hip ER with gliders , lateral stepping, fsu, and shuttle. Date: 6/22/2023  TX#: 2/8-10 Date:6/26/23                 TX#: 3/8-10 Date:                 TX#: 4/ Date:                 TX#: 5/ Date:    Tx#: 6/   MT  -Hip harmonics hip long axis oscillations  -jt mob: long axis traction;    -hip distraction mob: lateral/inf/post  -hip lateral distraction manip  -femoral long axis traction manip  -S/L MWM hip ext with anterior glides  -S/L medial glide of femur  -L SIJ manip    -MET to correct L posterior iliac rot 10 sec x3;   -FMP jt technique supine into hip flexion with MET holds  TE  -clam 10x slowly   -S/L hip Abd 15x  -Supine B LE ball roll outs with feet on ball to promote hip flexion ROM 20 rep  -self MET correction for L post ilium 10 sec x3;   -BKFO 5x ea:  pain on L; Pain improved with abdominal contraction and stable pelvis  -MET holds with resistance against B ASIS \"resist my compression\" 5 sec x6;   -BKFO with abdominal contraction R/L 5x: painless.    -Quadruped rocking  cues for small rom with slight hip ER 10x;   MT  -Hip harmonics hip long axis oscillations  -hip distraction mob: lateral/inf/post mob/manip  -hip lateral distraction manip  -femoral long axis traction manip  -Prone Prabhakar technique for sacral correction   TE  -clam 20x slowly   -S/L hip Abd 15x  -self MET correction for R Anterior  ilium 10 sec x3;   -BKFO 5x ea:  pain on L; Pain improved with abdominal contraction and stable pelvis  -MET holds with resistance against B ASIS \"resist my compression\" 5 sec x6;   -BKFO with abdominal contraction R/L 5x: painless.    -Quadruped rocking  cues for small rom with slight hip ER 10x;  -2-way femoral glide quadruped rocking with T band and towel in the groin: 20x ea way  -1 way femoral anterior glide 1/2 kneel 20x:  cues for small rom. -hip hikes for PGM up-trainin sec x3 ea leg              HEP:  -quadruped rocking   -clam  -S/L hip Abd 15x  -MET to correct L posterior iliac rotation HEP:  2-way femoral glide quadruped rocking with T band and towel in the groin: 20x ea way  -1 way femoral anterior glide 1/2 kneel 20x:  cues for small rom.    -hip hikes for PGM up-trainin sec x3 ea leg (forgot to give picture)    -MET to correct L posterior iliac rotation:stop this ex due to hip pain HEP: HEP: HEP:            Charges: MT x1 (15 min); TE x2 (30 min)        Total Timed Treatment: 45 min  Total Treatment Time: 50 min

## 2023-06-26 ENCOUNTER — OFFICE VISIT (OUTPATIENT)
Dept: PHYSICAL THERAPY | Facility: HOSPITAL | Age: 63
End: 2023-06-26
Attending: ORTHOPAEDIC SURGERY
Payer: COMMERCIAL

## 2023-06-26 PROCEDURE — 97140 MANUAL THERAPY 1/> REGIONS: CPT

## 2023-06-26 PROCEDURE — 97110 THERAPEUTIC EXERCISES: CPT

## 2023-06-28 ENCOUNTER — TELEPHONE (OUTPATIENT)
Dept: PHYSICAL THERAPY | Facility: HOSPITAL | Age: 63
End: 2023-06-28

## 2023-06-30 ENCOUNTER — TELEPHONE (OUTPATIENT)
Dept: PHYSICAL THERAPY | Facility: HOSPITAL | Age: 63
End: 2023-06-30

## 2023-07-06 ENCOUNTER — OFFICE VISIT (OUTPATIENT)
Dept: PHYSICAL THERAPY | Facility: HOSPITAL | Age: 63
End: 2023-07-06
Attending: ORTHOPAEDIC SURGERY
Payer: COMMERCIAL

## 2023-07-06 PROCEDURE — 97140 MANUAL THERAPY 1/> REGIONS: CPT

## 2023-07-06 PROCEDURE — 97110 THERAPEUTIC EXERCISES: CPT

## 2023-07-12 ENCOUNTER — OFFICE VISIT (OUTPATIENT)
Dept: PHYSICAL THERAPY | Facility: HOSPITAL | Age: 63
End: 2023-07-12
Attending: ORTHOPAEDIC SURGERY
Payer: COMMERCIAL

## 2023-07-12 PROCEDURE — 97140 MANUAL THERAPY 1/> REGIONS: CPT

## 2023-07-12 PROCEDURE — 97110 THERAPEUTIC EXERCISES: CPT

## 2023-07-12 NOTE — PROGRESS NOTES
Diagnosis:   Primary osteoarthritis of left hip (Y02.06         Referring Provider: Remus Primrose  Date of Evaluation:    6/19/2023    Precautions:  None Next MD visit:   none scheduled  Date of Surgery: n/a     Insurance Primary/Secondary: [employer] [place] P[place] / N/A     # Auth Visits: no limit            Subjective: Pt reports feeling 60-70% improved since start of therapy. Still has pain with active hip flexion or forward trunk flexion with flexed hip. Patient reports chronic history of low back pain. States that longer periods of standing and walking aggravate low back pain. Pain is usually along central low back. Pain: L hip pain:  0/10 pre and post Rx today     IE  Pt describes pain level current 0/10, at best 0/10, at worst 7/10. Pain present about 60-70% of the time. Average pain level 4/10;    Current functional limitations include: doing grocery shopping, (only 1x recently), doing car transfers, flexing L LE up, sidestepping especially with flexed knees, figure 4 position to don shoes/socks. Misc. Pain better with: meloxicam   Night pain: was waking up with pain 4-5x/night;  not now since taking meloxicam.   Sleep position: L side with L leg flexed up; Reports this feels good. Day pattern of pain: pain starts around noon, and is worse by evening. Occupation: desk job,   Living situation: house with stairs, no problem doing stairs  Activity Level: doing stairs at home, walking 40 min  5x/wk during lunch at work and then walking again at night for 30-40 minutes; Walks on weekends walks for a couple hours and is able to walk up/down hill without increasing symptoms. Objective:     7/6/23  Posture:  kyphotic-lordotic posture  Gait: pt ambulates on level ground with thania hip AGMR and lumbar ext /rot syndrome       AROM: (* denotes performed with pain)  Hip   Flexion: R wnl; L 105 inc 112 post Rx   Abduction: R wnl; L 30 deg erp; inc 35 deg erp still   ER: R wnl; L 30 (no erp); inc 35 deg post Rx.    IR: R wnl; L 10 erp; inc 20 deg and no erp post Rx. Capsular pattern of the hip noted. Accessory motion:  Hip jt restricted long axis distraction, lateral distraction, inferior glide, post glide      Strength/MMT: (* denotes performed with pain)  Hip   Flexion: R 5/5; L 4/5 (+) pain   Abduction (GMP)  R 3-/5; L 2+/5  ER: R 4+/5; L 4/5  IR: R 5/5; L 5/5          Assessment: Patient remains limited with passive hip ROM with pain at end range flexion and rotation. Assessed low back as well with noted limitation in lumbar extension and left side-bending with reproduction of hip symptoms. Muscle energy technique performed for pelvic realignment. Noted improvements in standing figure 4 position post-treat. Goals: (to be met in 8-10 visits)    Pt will improve L hip flexion by 5-10 deg to promote ease of car/tub transfers. Progressing  Pt will demo improved ease of donning/doffing shoes. Progressing  Pt will be able to walk 30-40 minutes for grocery shopping without  discomfort. MET  Pt will improve muscle performance of the L iliopsoas for functional gait and stairs. Pt will improve muscle performance of B PGM, (L>R) to promote improved ease of walking, doing stairs, and side stepping for work outs. Progressing  Pt will improve strength of Glut max to aid with sit to stand transfers and push off phase of gait. Pt will report less pain at night with sleeping. Pt will be indep with HEP for self management. Progressing    Plan: Plan to continue skilled PT to address jt restrictions, restore ROM, and progress with strengthening to achieve goals as outlined and to promote functional ADL. Next session add LSU with hinged hips. Date: 6/22/2023  TX#: 2/8-10 Date:6/26/23                 TX#: 3/8-10 Date: 7/6/23               TX#: 4/8-10 Date: 7/12/2023  TX#: 5/8-10 Date:    Tx#: 6/   MT  -Hip harmonics hip long axis oscillations  -jt mob: long axis traction;    -hip distraction mob: lateral/inf/post  -hip lateral distraction manip  -femoral long axis traction manip  -S/L MWM hip ext with anterior glides  -S/L medial glide of femur  -L SIJ manip    -MET to correct L posterior iliac rot 10 sec x3;   -FMP jt technique supine into hip flexion with MET holds  TE  -clam 10x slowly   -S/L hip Abd 15x  -Supine B LE ball roll outs with feet on ball to promote hip flexion ROM 20 rep  -self MET correction for L post ilium 10 sec x3;   -BKFO 5x ea:  pain on L; Pain improved with abdominal contraction and stable pelvis  -MET holds with resistance against B ASIS \"resist my compression\" 5 sec x6;   -BKFO with abdominal contraction R/L 5x: painless.    -Quadruped rocking  cues for small rom with slight hip ER 10x;   MT  -Hip harmonics hip long axis oscillations  -hip distraction mob: lateral/inf/post mob/manip  -hip lateral distraction manip  -femoral long axis traction manip  -Prone Prabhakar technique for sacral correction   TE  -clam 20x slowly   -S/L hip Abd 15x  -self MET correction for R Anterior  ilium 10 sec x3;   -BKFO 5x ea:  pain on L; Pain improved with abdominal contraction and stable pelvis  -MET holds with resistance against B ASIS \"resist my compression\" 5 sec x6;   -BKFO with abdominal contraction R/L 5x: painless.    -Quadruped rocking  cues for small rom with slight hip ER 10x;  -2-way femoral glide quadruped rocking with T band and towel in the groin: 20x ea way  -1 way femoral anterior glide 1/2 kneel 20x:  cues for small rom.    -hip hikes for PGM up-trainin sec x3 ea leg  MT  -Hip harmonics hip long axis oscillations  -hip distraction mob: lateral/inf/post mob/  manip  -hip lateral distraction manip  -femoral long axis traction manip  -Mob with blet aroudn therapist L hip distraction  -MWM  L hip ER/IR using belt around therapist L hip distraction  TE  -B hip ER with gliders  10 sec /10x   -shuttle in R S/L 25# Pushing thru L heel 25x;     -MET holds in fig 4 position doing C/R and increasing Er ea time; -Quadruped rocking  cues for small rom with slight hip ER 10x;  -2-way femoral glide quadruped rocking with T band and towel in the groin: 20x ea way  -hip hikes for PGM up-trainin sec x3 on Left (pt fatigued)   -lateral stepping 5ft x2 R/L   -lateral stepping with yellow t band at ankles 5 ft x5 R/L   -shuttle in R S/L with L foot only on foot board: pushing thru L heel:25#/ 25x;  MT  -Hip harmonics hip long axis oscillations  -Assessment lumbar spine joint mobility  -Muscle energy pelvic hip abd/add    TE  -Quadruped rocking  cues for small rom with slight hip ER 10x;  -2-way femoral glide quadruped rocking with T band and towel in the groin: 20x ea way  -Assessment lumbar spine AROM  -Hip abd ISO in hook-lying 10sec x 10           HEP:  -quadruped rocking   -clam  -S/L hip Abd 15x  -MET to correct L posterior iliac rotation HEP:  2-way femoral glide quadruped rocking with T band and towel in the groin: 20x ea way  -hip hikes for PGM up-trainin sec x3 ea leg (forgot to give picture)    -MET to correct L posterior iliac rotation:stop this ex due to hip pain HEP:  -lateral stepping with yellow t band at ankles 5 ft x5 R/L     -quadruped rocking     -2-way femoral glide quadruped rocking with T band and towel in the groin: 20x ea way    -hip hikes for PGM up-trainin sec x3 ea leg (forgot to give picture)      -clam  -S/L hip Abd 15x HEP: HEP:            Charges: MT x1 (18 min); TE x2 (24 min)        Total Timed Treatment: 42 min  Total Treatment Time: 42min

## 2023-07-14 ENCOUNTER — OFFICE VISIT (OUTPATIENT)
Dept: PHYSICAL THERAPY | Facility: HOSPITAL | Age: 63
End: 2023-07-14
Attending: ORTHOPAEDIC SURGERY
Payer: COMMERCIAL

## 2023-07-14 PROCEDURE — 97140 MANUAL THERAPY 1/> REGIONS: CPT

## 2023-07-14 PROCEDURE — 97110 THERAPEUTIC EXERCISES: CPT

## 2023-07-14 NOTE — PROGRESS NOTES
Diagnosis:   Primary osteoarthritis of left hip (Q28.73         Referring Provider: Linsey Almanzar  Date of Evaluation:    6/19/2023    Precautions:  None Next MD visit:   none scheduled  Date of Surgery: n/a     Insurance Primary/Secondary: [employer] [place] PPO / N/A     # Auth Visits: no limit            Subjective: Pt states that ROM is improved into figure 4 position. Felt some tightness along anterior hip walking in today. Pain: L hip pain:  0/10 pre and post Rx today     IE  Pt describes pain level current 0/10, at best 0/10, at worst 7/10. Pain present about 60-70% of the time. Average pain level 4/10;    Current functional limitations include: doing grocery shopping, (only 1x recently), doing car transfers, flexing L LE up, sidestepping especially with flexed knees, figure 4 position to don shoes/socks. Misc. Pain better with: meloxicam   Night pain: was waking up with pain 4-5x/night;  not now since taking meloxicam.   Sleep position: L side with L leg flexed up; Reports this feels good. Day pattern of pain: pain starts around noon, and is worse by evening. Occupation: desk job,   Living situation: house with stairs, no problem doing stairs  Activity Level: doing stairs at home, walking 40 min  5x/wk during lunch at work and then walking again at night for 30-40 minutes; Walks on weekends walks for a couple hours and is able to walk up/down hill without increasing symptoms. Objective:     7/6/23  Posture:  kyphotic-lordotic posture  Gait: pt ambulates on level ground with thania hip AGMR and lumbar ext /rot syndrome       AROM: (* denotes performed with pain)  Hip   Flexion: R wnl; L 105 inc 112 post Rx   Abduction: R wnl; L 30 deg erp; inc 35 deg erp still   ER: R wnl; L 30 (no erp); inc 35 deg post Rx. IR: R wnl; L 10 erp; inc 20 deg and no erp post Rx. Capsular pattern of the hip noted.      Accessory motion:  Hip jt restricted long axis distraction, lateral distraction, inferior glide, post glide      Strength/MMT: (* denotes performed with pain)  Hip   Flexion: R 5/5; L 4/5 (+) pain   Abduction (GMP)  R 3-/5; L 2+/5  ER: R 4+/5; L 4/5  IR: R 5/5; L 5/5          Assessment: Good carryover in ROM with figure 4 position in standing from previous session. Addressed lumbar mobility with manual work and noted improvements in unrestricted lumbar side-bending. Goals: (to be met in 8-10 visits)    Pt will improve L hip flexion by 5-10 deg to promote ease of car/tub transfers. Progressing  Pt will demo improved ease of donning/doffing shoes. Progressing  Pt will be able to walk 30-40 minutes for grocery shopping without  discomfort. MET  Pt will improve muscle performance of the L iliopsoas for functional gait and stairs. Pt will improve muscle performance of B PGM, (L>R) to promote improved ease of walking, doing stairs, and side stepping for work outs. Progressing  Pt will improve strength of Glut max to aid with sit to stand transfers and push off phase of gait. Pt will report less pain at night with sleeping. Pt will be indep with HEP for self management. Progressing    Plan: Plan to continue skilled PT to address jt restrictions, restore ROM, and progress with strengthening to achieve goals as outlined and to promote functional ADL. Next session add LSU with hinged hips.       Date: 6/22/2023  TX#: 2/8-10 Date:6/26/23                 TX#: 3/8-10 Date: 7/6/23               TX#: 4/8-10 Date: 7/12/2023  TX#: 5/8-10 Date: 7/14/2023  Tx#: 6/8-10   MT  -Hip harmonics hip long axis oscillations  -jt mob: long axis traction;    -hip distraction mob: lateral/inf/post  -hip lateral distraction manip  -femoral long axis traction manip  -S/L MWM hip ext with anterior glides  -S/L medial glide of femur  -L SIJ manip    -MET to correct L posterior iliac rot 10 sec x3;   -FMP jt technique supine into hip flexion with MET holds  TE  -clam 10x slowly   -S/L hip Abd 15x  -Supine B LE ball roll outs with feet on ball to promote hip flexion ROM 20 rep  -self MET correction for L post ilium 10 sec x3;   -BKFO 5x ea:  pain on L; Pain improved with abdominal contraction and stable pelvis  -MET holds with resistance against B ASIS \"resist my compression\" 5 sec x6;   -BKFO with abdominal contraction R/L 5x: painless.    -Quadruped rocking  cues for small rom with slight hip ER 10x;   MT  -Hip harmonics hip long axis oscillations  -hip distraction mob: lateral/inf/post mob/manip  -hip lateral distraction manip  -femoral long axis traction manip  -Prone Prabhakar technique for sacral correction   TE  -clam 20x slowly   -S/L hip Abd 15x  -self MET correction for R Anterior  ilium 10 sec x3;   -BKFO 5x ea:  pain on L; Pain improved with abdominal contraction and stable pelvis  -MET holds with resistance against B ASIS \"resist my compression\" 5 sec x6;   -BKFO with abdominal contraction R/L 5x: painless.    -Quadruped rocking  cues for small rom with slight hip ER 10x;  -2-way femoral glide quadruped rocking with T band and towel in the groin: 20x ea way  -1 way femoral anterior glide 1/2 kneel 20x:  cues for small rom.    -hip hikes for PGM up-trainin sec x3 ea leg  MT  -Hip harmonics hip long axis oscillations  -hip distraction mob: lateral/inf/post mob/  manip  -hip lateral distraction manip  -femoral long axis traction manip  -Mob with carrington meyers therapist L hip distraction  -MWM  L hip ER/IR using belt around therapist L hip distraction  TE  -B hip ER with gliders  10 sec /10x   -shuttle in R S/L 25# Pushing thru L heel 25x;     -MET holds in fig 4 position doing C/R and increasing Er ea time;   -Quadruped rocking  cues for small rom with slight hip ER 10x;  -2-way femoral glide quadruped rocking with T band and towel in the groin: 20x ea way  -hip hikes for PGM up-trainin sec x3 on Left (pt fatigued)   -lateral stepping 5ft x2 R/L   -lateral stepping with yellow t band at ankles 5 ft x5 R/L   -shuttle in R S/L with L foot only on foot board: pushing thru L heel:25#/ 25x;  MT  -Hip harmonics hip long axis oscillations  -Assessment lumbar spine joint mobility  -Muscle energy pelvic hip abd/add    TE  -Quadruped rocking  cues for small rom with slight hip ER 10x;  -2-way femoral glide quadruped rocking with T band and towel in the groin: 20x ea way  -Assessment lumbar spine AROM  -Hip abd ISO in hook-lying 10sec x 10 MT  -L/R UPA T-L junction Gr III  -Side-lying lumbar opening technique R side-lying   -Muscle energy pelvic hip abd/add    TE  -Hip abd ISO in hook-lying 10sec x 10  -Hip abd ISO with bridge 10x3  -Hip hike off step 5sec to fatigue x 3 sets  -Assessment lumbar ROM  -Open book x 15 B          HEP:  -quadruped rocking   -clam  -S/L hip Abd 15x  -MET to correct L posterior iliac rotation HEP:  2-way femoral glide quadruped rocking with T band and towel in the groin: 20x ea way  -hip hikes for PGM up-trainin sec x3 ea leg (forgot to give picture)    -MET to correct L posterior iliac rotation:stop this ex due to hip pain HEP:  -lateral stepping with yellow t band at ankles 5 ft x5 R/L     -quadruped rocking     -2-way femoral glide quadruped rocking with T band and towel in the groin: 20x ea way    -hip hikes for PGM up-trainin sec x3 ea leg (forgot to give picture)      -clam  -S/L hip Abd 15x HEP: HEP:            Charges: MT x1 (18 min); TE x2 (24 min)        Total Timed Treatment: 42 min  Total Treatment Time: 42min

## 2023-07-18 ENCOUNTER — OFFICE VISIT (OUTPATIENT)
Dept: PHYSICAL THERAPY | Facility: HOSPITAL | Age: 63
End: 2023-07-18
Attending: ORTHOPAEDIC SURGERY
Payer: COMMERCIAL

## 2023-07-18 PROCEDURE — 97110 THERAPEUTIC EXERCISES: CPT

## 2023-07-18 NOTE — PROGRESS NOTES
Diagnosis:   Primary osteoarthritis of left hip (D60.21         Referring Provider: Elizabeth Felipe  Date of Evaluation:    6/19/2023    Precautions:  None Next MD visit:   none scheduled  Date of Surgery: n/a     Insurance Primary/Secondary: [employer] [place] PPO / N/A     # Auth Visits: no limit            Subjective: Pt reports she no longer has issues achieving figure 4 position to don/doff socks and shoes. Patient reports feeling 80% improved since start of PT. States that she notices tightness along anterior hip with walking fast and occasional twinges of pain along medial thigh with walking. Pain: L hip pain:  0/10 pre and post Rx today     IE  Pt describes pain level current 0/10, at best 0/10, at worst 7/10. Pain present about 60-70% of the time. Average pain level 4/10;    Current functional limitations include: doing grocery shopping, (only 1x recently), doing car transfers, flexing L LE up, sidestepping especially with flexed knees, figure 4 position to don shoes/socks. Misc. Pain better with: meloxicam   Night pain: was waking up with pain 4-5x/night;  not now since taking meloxicam.   Sleep position: L side with L leg flexed up; Reports this feels good. Day pattern of pain: pain starts around noon, and is worse by evening. Occupation: desk job,   Living situation: house with stairs, no problem doing stairs  Activity Level: doing stairs at home, walking 40 min  5x/wk during lunch at work and then walking again at night for 30-40 minutes; Walks on weekends walks for a couple hours and is able to walk up/down hill without increasing symptoms. Objective:     7/6/23  Posture:  kyphotic-lordotic posture  Gait: pt ambulates on level ground with thania hip AGMR and lumbar ext /rot syndrome       AROM: (* denotes performed with pain)  Hip   Flexion: R wnl; L 105 inc 112 post Rx   Abduction: R wnl; L 30 deg erp; inc 35 deg erp still   ER: R wnl; L 30 (no erp); inc 35 deg post Rx.    IR: R wnl; L 10 erp; inc 20 deg and no erp post Rx. Capsular pattern of the hip noted. Accessory motion:  Hip jt restricted long axis distraction, lateral distraction, inferior glide, post glide      Strength/MMT: (* denotes performed with pain)  Hip   Flexion: R 5/5; L 4/5 (+) pain   Abduction (GMP)  R 3-/5; L 2+/5  ER: R 4+/5; L 4/5  IR: R 5/5; L 5/5          Assessment: Patient with remaining limitations in passive hip range of motion in all directions; actively, able to move through functional range of motion to don socks and shoes without limitation. Patient participates in gluteal strengthening and lumbo-pelvic retraining with cueing for L sided lumbo-pelvic dissociation LLE. Overall, she is progressing well towards long term goals and able to manage symptoms well with home program.     Goals: (to be met in 8-10 visits)    Pt will improve L hip flexion by 5-10 deg to promote ease of car/tub transfers. Progressing  Pt will demo improved ease of donning/doffing shoes. Progressing  Pt will be able to walk 30-40 minutes for grocery shopping without  discomfort. MET  Pt will improve muscle performance of the L iliopsoas for functional gait and stairs. Pt will improve muscle performance of B PGM, (L>R) to promote improved ease of walking, doing stairs, and side stepping for work outs. Progressing  Pt will improve strength of Glut max to aid with sit to stand transfers and push off phase of gait. Pt will report less pain at night with sleeping. Pt will be indep with HEP for self management. Progressing    Plan: Plan to continue skilled PT to address jt restrictions, restore ROM, and progress with strengthening to achieve goals as outlined and to promote functional ADL. Next session add LSU with hinged hips.       Date: 6/22/2023  TX#: 2/8-10 Date:6/26/23                 TX#: 3/8-10 Date: 7/6/23               TX#: 4/8-10 Date: 7/12/2023  TX#: 5/8-10 Date: 7/14/2023  Tx#: 6/8-10 Date: 7/18/2023  Tx#: 7/8-10   MT  -Hip harmonics hip long axis oscillations  -jt mob: long axis traction;    -hip distraction mob: lateral/inf/post  -hip lateral distraction manip  -femoral long axis traction manip  -S/L MWM hip ext with anterior glides  -S/L medial glide of femur  -L SIJ manip    -MET to correct L posterior iliac rot 10 sec x3;   -FMP jt technique supine into hip flexion with MET holds  TE  -clam 10x slowly   -S/L hip Abd 15x  -Supine B LE ball roll outs with feet on ball to promote hip flexion ROM 20 rep  -self MET correction for L post ilium 10 sec x3;   -BKFO 5x ea:  pain on L; Pain improved with abdominal contraction and stable pelvis  -MET holds with resistance against B ASIS \"resist my compression\" 5 sec x6;   -BKFO with abdominal contraction R/L 5x: painless.    -Quadruped rocking  cues for small rom with slight hip ER 10x;   MT  -Hip harmonics hip long axis oscillations  -hip distraction mob: lateral/inf/post mob/manip  -hip lateral distraction manip  -femoral long axis traction manip  -Prone Prabhakar technique for sacral correction   TE  -clam 20x slowly   -S/L hip Abd 15x  -self MET correction for R Anterior  ilium 10 sec x3;   -BKFO 5x ea:  pain on L; Pain improved with abdominal contraction and stable pelvis  -MET holds with resistance against B ASIS \"resist my compression\" 5 sec x6;   -BKFO with abdominal contraction R/L 5x: painless.    -Quadruped rocking  cues for small rom with slight hip ER 10x;  -2-way femoral glide quadruped rocking with T band and towel in the groin: 20x ea way  -1 way femoral anterior glide 1/2 kneel 20x:  cues for small rom.    -hip hikes for PGM up-trainin sec x3 ea leg  MT  -Hip harmonics hip long axis oscillations  -hip distraction mob: lateral/inf/post mob/  manip  -hip lateral distraction manip  -femoral long axis traction manip  -Mob with blet aroudn therapist L hip distraction  -MWM  L hip ER/IR using belt around therapist L hip distraction  TE  -B hip ER with gliders  10 sec /10x -shuttle in R S/L 25# Pushing thru L heel 25x;     -MET holds in fig 4 position doing C/R and increasing Er ea time;   -Quadruped rocking  cues for small rom with slight hip ER 10x;  -2-way femoral glide quadruped rocking with T band and towel in the groin: 20x ea way  -hip hikes for PGM up-trainin sec x3 on Left (pt fatigued)   -lateral stepping 5ft x2 R/L   -lateral stepping with yellow t band at ankles 5 ft x5 R/L   -shuttle in R S/L with L foot only on foot board: pushing thru L heel:25#/ 25x;  MT  -Hip harmonics hip long axis oscillations  -Assessment lumbar spine joint mobility  -Muscle energy pelvic hip abd/add    TE  -Quadruped rocking  cues for small rom with slight hip ER 10x;  -2-way femoral glide quadruped rocking with T band and towel in the groin: 20x ea way  -Assessment lumbar spine AROM  -Hip abd ISO in hook-lying 10sec x 10 MT  -L/R UPA T-L junction Gr III  -Side-lying lumbar opening technique R side-lying   -Muscle energy pelvic hip abd/add    TE  -Hip abd ISO in hook-lying 10sec x 10  -Hip abd ISO with bridge 10x3  -Hip hike off step 5sec to fatigue x 3 sets  -Assessment lumbar ROM  -Open book x 15 B               TE  -Assessment hip ROM  -Half-kneeling hip flexor stretch (max cueing for maintained posterior pelvic tilt) 10sec x 5 B  -Hip abd ISO with belt into bridge 10x3  -Hip add ISO with ball into bridge 10x3  -Side plank (knees bent)   -Single leg dead lift (cone on 8in step) 6x2 B - cueing to focus on hip hinge           HEP:  -quadruped rocking   -clam  -S/L hip Abd 15x  -MET to correct L posterior iliac rotation HEP:  2-way femoral glide quadruped rocking with T band and towel in the groin: 20x ea way  -hip hikes for PGM up-trainin sec x3 ea leg (forgot to give picture)    -MET to correct L posterior iliac rotation:stop this ex due to hip pain HEP:  -lateral stepping with yellow t band at ankles 5 ft x5 R/L     -quadruped rocking     -2-way femoral glide quadruped rocking with T band and towel in the groin: 20x ea way    -hip hikes for PGM up-trainin sec x3 ea leg (forgot to give picture)      -clam  -S/L hip Abd 15x HEP: HEP:              Charges: TE x3 (44min)        Total Timed Treatment: 44 min  Total Treatment Time: 44min

## 2023-07-20 ENCOUNTER — OFFICE VISIT (OUTPATIENT)
Dept: PHYSICAL THERAPY | Facility: HOSPITAL | Age: 63
End: 2023-07-20
Attending: ORTHOPAEDIC SURGERY
Payer: COMMERCIAL

## 2023-07-20 PROCEDURE — 97110 THERAPEUTIC EXERCISES: CPT

## 2023-07-20 NOTE — PROGRESS NOTES
Discharge Summary  Pt has attended 8 visits in Physical Therapy. Diagnosis:   Primary osteoarthritis of left hip (Z38.92         Referring Provider: Linsey Almanzar  Date of Evaluation:    6/19/2023    Precautions:  None Next MD visit:   none scheduled  Date of Surgery: n/a     Insurance Primary/Secondary: [employer] [place] PPO / N/A     # Auth Visits: no limit            Subjective: Pt reports she no longer has issues achieving figure 4 position to don/doff socks and shoes. Patient reports feeling 80% improved since start of PT. Reports she feels ready to be discharged from PT. States that she notices tightness along anterior L hip when walking fast and demos the extension phase of gait. Reports no longer having pain or difficulty with the following: donning shoes/socks, doing car transfers, or flexing L leg up. Reports no longer waking up at night with pain, and no longer getting pain at noon like previously. Pain: L hip pain:  0/10 pre and post Rx today     Objective:     7/20/23 Discharge Note      Gait: pt ambulates on level ground with thania hip AGMR and lumbar ext /rot syndrome       AROM: (* denotes performed with pain)  Hip   Flexion: R wnl; L 110 erp   Abduction: R wnl; L 30 deg   ER: R wnl; L 35 . IR: R wnl; L 20 erp; Accessory motion:  Hip jt restricted long axis distraction, lateral distraction, inferior glide, post glide      Strength/MMT: (* denotes performed with pain)  Hip   Flexion: R 5/5; L 4/5 (+) mild pain   Abduction (GMP)  R 3+/5; L 3+/5  ER: R 5/5; L 4/5  IR: R 5/5; L 5/5      Lumbar AROM: KEY: NE=no effect      Baseline Symptoms:  none  Lumbar AROM        Pain (+/-)   R Rotation 50% wnl ne   L Rotation 75% wnl ne   R Side bend 75% wnl ne   L Side bend 75% wnl ne   Extension 50% wnl ne   Flexion 75% wnl Ne, but flat in lumbar sp         Assessment: Pt has met most goals. Pt is indep with HEP. Pt is ready for D/C  from PT.       Goals: (to be met in 8-10 visits)    Pt will improve L hip flexion by 5-10 deg to promote ease of car/tub transfers. MET  Pt will demo improved ease of donning/doffing shoes. MET  Pt will be able to walk 30-40 minutes for grocery shopping without  discomfort. MET  Pt will improve muscle performance of the L iliopsoas for functional gait and stairs. MET  Pt will improve muscle performance of B PGM, (L>R) to promote improved ease of walking, doing stairs, and side stepping for work outs. Progressing  Pt will improve strength of Glut max to aid with sit to stand transfers and push off phase of gait. MET  Pt will report less pain at night with sleeping. MET  Pt will be indep with HEP for self management. MET    Post LEFS Score  Post LEFS Score: 100 % (7/20/2023  6:18 PM)    7.5 % improvement    Plan: D/C  PT. Pt to continue with HEP and f/u with MD as needed.        Date: 6/22/2023  TX#: 2/8-10 Date:6/26/23                 TX#: 3/8-10 Date: 7/6/23               TX#: 4/8-10 Date: 7/12/2023  TX#: 5/8-10 Date: 7/14/2023  Tx#: 6/8-10 Date: 7/18/2023  Tx#: 7/8-10 Date: 7/20/23  Tx#: 8/8-10   MT  -Hip harmonics hip long axis oscillations  -jt mob: long axis traction;    -hip distraction mob: lateral/inf/post  -hip lateral distraction manip  -femoral long axis traction manip  -S/L MWM hip ext with anterior glides  -S/L medial glide of femur  -L SIJ manip    -MET to correct L posterior iliac rot 10 sec x3;   -FMP jt technique supine into hip flexion with MET holds  TE  -clam 10x slowly   -S/L hip Abd 15x  -Supine B LE ball roll outs with feet on ball to promote hip flexion ROM 20 rep  -self MET correction for L post ilium 10 sec x3;   -BKFO 5x ea:  pain on L; Pain improved with abdominal contraction and stable pelvis  -MET holds with resistance against B ASIS \"resist my compression\" 5 sec x6;   -BKFO with abdominal contraction R/L 5x: painless.    -Quadruped rocking  cues for small rom with slight hip ER 10x;   MT  -Hip harmonics hip long axis oscillations  -hip distraction mob: lateral/inf/post mob/manip  -hip lateral distraction manip  -femoral long axis traction manip  -Prone Prabhakar technique for sacral correction   TE  -clam 20x slowly   -S/L hip Abd 15x  -self MET correction for R Anterior  ilium 10 sec x3;   -BKFO 5x ea:  pain on L; Pain improved with abdominal contraction and stable pelvis  -MET holds with resistance against B ASIS \"resist my compression\" 5 sec x6;   -BKFO with abdominal contraction R/L 5x: painless.    -Quadruped rocking  cues for small rom with slight hip ER 10x;  -2-way femoral glide quadruped rocking with T band and towel in the groin: 20x ea way  -1 way femoral anterior glide 1/2 kneel 20x:  cues for small rom.    -hip hikes for PGM up-trainin sec x3 ea leg  MT  -Hip harmonics hip long axis oscillations  -hip distraction mob: lateral/inf/post mob/  manip  -hip lateral distraction manip  -femoral long axis traction manip  -Mob with carrington meyers therapist L hip distraction  -MWM  L hip ER/IR using belt around therapist L hip distraction  TE  -B hip ER with gliders  10 sec /10x   -shuttle in R S/L 25# Pushing thru L heel 25x;     -MET holds in fig 4 position doing C/R and increasing Er ea time;   -Quadruped rocking  cues for small rom with slight hip ER 10x;  -2-way femoral glide quadruped rocking with T band and towel in the groin: 20x ea way  -hip hikes for PGM up-trainin sec x3 on Left (pt fatigued)   -lateral stepping 5ft x2 R/L   -lateral stepping with yellow t band at ankles 5 ft x5 R/L   -shuttle in R S/L with L foot only on foot board: pushing thru L heel:25#/ 25x;  MT  -Hip harmonics hip long axis oscillations  -Assessment lumbar spine joint mobility  -Muscle energy pelvic hip abd/add    TE  -Quadruped rocking  cues for small rom with slight hip ER 10x;  -2-way femoral glide quadruped rocking with T band and towel in the groin: 20x ea way  -Assessment lumbar spine AROM  -Hip abd ISO in hook-lying 10sec x 10 MT  -L/R UPA T-L junction Gr III  -Side-lying lumbar opening technique R side-lying   -Muscle energy pelvic hip abd/add    TE  -Hip abd ISO in hook-lying 10sec x 10  -Hip abd ISO with bridge 10x3  -Hip hike off step 5sec to fatigue x 3 sets  -Assessment lumbar ROM  -Open book x 15 B               TE  -Assessment hip ROM  -Half-kneeling hip flexor stretch (max cueing for maintained posterior pelvic tilt) 10sec x 5 B  -Hip abd ISO with belt into bridge 10x3  -Hip add ISO with ball into bridge 10x3  -Side plank (knees bent)   -Single leg dead lift (cone on 8in step) 6x2 B - cueing to focus on hip hinge MT    -hip distraction mob: lateral/inf/post mob/manip  -hip lateral distraction manip    TE  -ROM and functional activities with the re assessment.   -tests and measures  -Reviewed HEP  -Reviewed self management   -Half-kneeling hip flexor stretch (max cueing for maintained posterior pelvic tilt) 10sec x 3 thania  -Hip abd ISO with belt into bridge 10x  -Hip add ISO with ball into bridge 10x  -Side plank (knees bent) 10 sec x3 thania  -clam 10x ea cues to go slow and keep pelvis fwd    -S/L hip Abd 10x  -Single leg dead lift (cone on 8in step) 5x on L LE.  cueing to focus on hip hinge  -Open book 10x ea  Verbal review:   -lateral stepping with yellow t band at ankles 5 ft x5 R/L     -quadruped rocking     -2-way femoral glide quadruped rocking with T band and towel in the groin: 20x ea way    -hip hikes for PGM up-trainin sec x3 ea leg (forgot to give picture)               HEP:  -quadruped rocking   -clam  -S/L hip Abd 15x  -MET to correct L posterior iliac rotation HEP:  2-way femoral glide quadruped rocking with T band and towel in the groin: 20x ea way  -hip hikes for PGM up-trainin sec x3 ea leg (forgot to give picture)    -MET to correct L posterior iliac rotation:stop this ex due to hip pain HEP:  -lateral stepping with yellow t band at ankles 5 ft x5 R/L     -quadruped rocking     -2-way femoral glide quadruped rocking with T band and towel in the groin: 20x ea way    -hip hikes for PGM up-trainin sec x3 ea leg (forgot to give picture)      -clam  -S/L hip Abd 15x HEP: HEP: HEP    -Half-kneeling hip flexor stretch (max cueing for maintained posterior pelvic tilt) 10sec x 5 B  LSU/down  Lateral stepping YTB  H/L hip Abd with belt  H/l pillow squeeae b/n knees  HEP    -Half-kneeling hip flexor stretch (max cueing for maintained posterior pelvic tilt) 10sec x 3 thania  -Hip abd ISO with belt into bridge 10x  -Hip add ISO with ball into bridge 10x  -Side plank (knees bent) 10 sec x3 thania  -clam 10x ea cues to go slow and keep pelvis fwd    -S/L hip Abd 10x  -Single leg dead lift (cone on 8in step) 5x on L LE.  cueing to focus on hip hinge  -Open book 10x ea              Charges: TE x3 (40min)        Total Timed Treatment: 40 min  Total Treatment Time: 45min

## 2024-04-03 PROBLEM — Z12.4 PAP SMEAR FOR CERVICAL CANCER SCREENING: Status: ACTIVE | Noted: 2024-04-03

## 2024-04-03 PROBLEM — H35.81 RETINAL EDEMA: Status: ACTIVE | Noted: 2022-08-23

## 2024-04-03 PROBLEM — H33.009 RETINAL DETACHMENT WITH RETINAL DEFECT: Status: ACTIVE | Noted: 2022-08-23

## 2024-04-04 ENCOUNTER — TELEPHONE (OUTPATIENT)
Dept: INTERNAL MEDICINE CLINIC | Facility: CLINIC | Age: 64
End: 2024-04-04

## 2024-04-04 ENCOUNTER — OFFICE VISIT (OUTPATIENT)
Dept: INTERNAL MEDICINE CLINIC | Facility: CLINIC | Age: 64
End: 2024-04-04
Payer: COMMERCIAL

## 2024-04-04 VITALS
DIASTOLIC BLOOD PRESSURE: 80 MMHG | TEMPERATURE: 98 F | HEART RATE: 65 BPM | WEIGHT: 157.38 LBS | HEIGHT: 69 IN | BODY MASS INDEX: 23.31 KG/M2 | SYSTOLIC BLOOD PRESSURE: 126 MMHG | OXYGEN SATURATION: 100 %

## 2024-04-04 DIAGNOSIS — R31.21 ASYMPTOMATIC MICROSCOPIC HEMATURIA: ICD-10-CM

## 2024-04-04 DIAGNOSIS — Z78.0 POSTMENOPAUSAL: ICD-10-CM

## 2024-04-04 DIAGNOSIS — Z00.00 ADULT GENERAL MEDICAL EXAMINATION: ICD-10-CM

## 2024-04-04 DIAGNOSIS — L40.9 PSORIASIS AND SIMILAR DISORDER: ICD-10-CM

## 2024-04-04 DIAGNOSIS — E78.00 HIGH CHOLESTEROL: Primary | ICD-10-CM

## 2024-04-04 DIAGNOSIS — Z86.69 HISTORY OF RETINAL DETACHMENT: ICD-10-CM

## 2024-04-04 DIAGNOSIS — Z71.85 VACCINE COUNSELING: ICD-10-CM

## 2024-04-04 DIAGNOSIS — Z12.4 PAP SMEAR FOR CERVICAL CANCER SCREENING: ICD-10-CM

## 2024-04-04 DIAGNOSIS — N89.8 VAGINAL DISCHARGE: Primary | ICD-10-CM

## 2024-04-04 DIAGNOSIS — Z12.31 ENCOUNTER FOR SCREENING MAMMOGRAM FOR MALIGNANT NEOPLASM OF BREAST: ICD-10-CM

## 2024-04-04 DIAGNOSIS — H25.12 AGE-RELATED NUCLEAR CATARACT OF LEFT EYE: ICD-10-CM

## 2024-04-04 LAB
ALBUMIN SERPL-MCNC: 4.7 G/DL (ref 3.2–4.8)
ALBUMIN/GLOB SERPL: 1.8 {RATIO} (ref 1–2)
ALP LIVER SERPL-CCNC: 56 U/L
ALT SERPL-CCNC: 30 U/L
ANION GAP SERPL CALC-SCNC: 5 MMOL/L (ref 0–18)
APPEARANCE: CLEAR
AST SERPL-CCNC: 22 U/L (ref ?–34)
BASOPHILS # BLD AUTO: 0.04 X10(3) UL (ref 0–0.2)
BASOPHILS NFR BLD AUTO: 0.8 %
BILIRUB SERPL-MCNC: 0.7 MG/DL (ref 0.2–1.1)
BILIRUB UR QL: NEGATIVE
BILIRUBIN: NEGATIVE
BUN BLD-MCNC: 15 MG/DL (ref 9–23)
BUN/CREAT SERPL: 23.1 (ref 10–20)
CALCIUM BLD-MCNC: 9.6 MG/DL (ref 8.7–10.4)
CHLORIDE SERPL-SCNC: 104 MMOL/L (ref 98–112)
CHOLEST SERPL-MCNC: 282 MG/DL (ref ?–200)
CLARITY UR: CLEAR
CO2 SERPL-SCNC: 28 MMOL/L (ref 21–32)
COLOR UR: COLORLESS
CREAT BLD-MCNC: 0.65 MG/DL
DEPRECATED RDW RBC AUTO: 44.5 FL (ref 35.1–46.3)
EGFRCR SERPLBLD CKD-EPI 2021: 99 ML/MIN/1.73M2 (ref 60–?)
EOSINOPHIL # BLD AUTO: 0.06 X10(3) UL (ref 0–0.7)
EOSINOPHIL NFR BLD AUTO: 1.3 %
ERYTHROCYTE [DISTWIDTH] IN BLOOD BY AUTOMATED COUNT: 12.8 % (ref 11–15)
FASTING PATIENT LIPID ANSWER: YES
FASTING STATUS PATIENT QL REPORTED: YES
GLOBULIN PLAS-MCNC: 2.6 G/DL (ref 2.8–4.4)
GLUCOSE (URINE DIPSTICK): NEGATIVE MG/DL
GLUCOSE BLD-MCNC: 82 MG/DL (ref 70–99)
GLUCOSE UR-MCNC: NORMAL MG/DL
HCT VFR BLD AUTO: 44.9 %
HDLC SERPL-MCNC: 99 MG/DL (ref 40–59)
HGB BLD-MCNC: 14.6 G/DL
HGB UR QL STRIP.AUTO: NEGATIVE
IMM GRANULOCYTES # BLD AUTO: 0.01 X10(3) UL (ref 0–1)
IMM GRANULOCYTES NFR BLD: 0.2 %
KETONES (URINE DIPSTICK): NEGATIVE MG/DL
KETONES UR-MCNC: NEGATIVE MG/DL
LDLC SERPL CALC-MCNC: 175 MG/DL (ref ?–100)
LEUKOCYTE ESTERASE UR QL STRIP.AUTO: 75
LYMPHOCYTES # BLD AUTO: 1.45 X10(3) UL (ref 1–4)
LYMPHOCYTES NFR BLD AUTO: 30.4 %
MCH RBC QN AUTO: 30.6 PG (ref 26–34)
MCHC RBC AUTO-ENTMCNC: 32.5 G/DL (ref 31–37)
MCV RBC AUTO: 94.1 FL
MONOCYTES # BLD AUTO: 0.31 X10(3) UL (ref 0.1–1)
MONOCYTES NFR BLD AUTO: 6.5 %
MULTISTIX LOT#: ABNORMAL NUMERIC
NEUTROPHILS # BLD AUTO: 2.9 X10 (3) UL (ref 1.5–7.7)
NEUTROPHILS # BLD AUTO: 2.9 X10(3) UL (ref 1.5–7.7)
NEUTROPHILS NFR BLD AUTO: 60.8 %
NITRITE UR QL STRIP.AUTO: NEGATIVE
NITRITE, URINE: NEGATIVE
NONHDLC SERPL-MCNC: 183 MG/DL (ref ?–130)
OSMOLALITY SERPL CALC.SUM OF ELEC: 284 MOSM/KG (ref 275–295)
PH UR: 5.5 [PH] (ref 5–8)
PH, URINE: 6 (ref 4.5–8)
PLATELET # BLD AUTO: 363 10(3)UL (ref 150–450)
POTASSIUM SERPL-SCNC: 4.2 MMOL/L (ref 3.5–5.1)
PROT SERPL-MCNC: 7.3 G/DL (ref 5.7–8.2)
PROT UR-MCNC: NEGATIVE MG/DL
PROTEIN (URINE DIPSTICK): NEGATIVE MG/DL
RBC # BLD AUTO: 4.77 X10(6)UL
SODIUM SERPL-SCNC: 137 MMOL/L (ref 136–145)
SP GR UR STRIP: <1.005 (ref 1–1.03)
SPECIFIC GRAVITY: 1 (ref 1–1.03)
TRIGL SERPL-MCNC: 54 MG/DL (ref 30–149)
TSI SER-ACNC: 1.07 MIU/ML (ref 0.55–4.78)
URINE-COLOR: YELLOW
UROBILINOGEN UR STRIP-ACNC: NORMAL
UROBILINOGEN,SEMI-QN: 0.2 MG/DL (ref 0–1.9)
VLDLC SERPL CALC-MCNC: 11 MG/DL (ref 0–30)
WBC # BLD AUTO: 4.8 X10(3) UL (ref 4–11)

## 2024-04-04 PROCEDURE — 99396 PREV VISIT EST AGE 40-64: CPT | Performed by: INTERNAL MEDICINE

## 2024-04-04 PROCEDURE — 3079F DIAST BP 80-89 MM HG: CPT | Performed by: INTERNAL MEDICINE

## 2024-04-04 PROCEDURE — 3008F BODY MASS INDEX DOCD: CPT | Performed by: INTERNAL MEDICINE

## 2024-04-04 PROCEDURE — 3074F SYST BP LT 130 MM HG: CPT | Performed by: INTERNAL MEDICINE

## 2024-04-04 PROCEDURE — 99214 OFFICE O/P EST MOD 30 MIN: CPT | Performed by: INTERNAL MEDICINE

## 2024-04-04 PROCEDURE — 81003 URINALYSIS AUTO W/O SCOPE: CPT | Performed by: INTERNAL MEDICINE

## 2024-04-04 NOTE — PROGRESS NOTES
HPI:    Patient ID: Carolina Davis is a 64 year old female.    Carolina Davis is a 64 year old female who presents for a complete physical exam.   HPI:     Chief Complaint   Patient presents with    Physical     Patient states she is here for Annual Physical Examination.         Patient's last menstrual period was 12/17/2014 (approximate).  Dyspareunia.      /80 (BP Location: Right arm, Patient Position: Sitting, Cuff Size: adult)   Pulse 65   Temp 97.9 °F (36.6 °C) (Oral)   Ht 5' 9\" (1.753 m)   Wt 157 lb 6.4 oz (71.4 kg)   LMP 12/17/2014 (Approximate)   SpO2 100%   BMI 23.24 kg/m²    Wt Readings from Last 4 Encounters:   04/04/24 157 lb 6.4 oz (71.4 kg)   04/03/23 151 lb 3.2 oz (68.6 kg)   07/21/22 149 lb (67.6 kg)   03/28/22 152 lb 12.8 oz (69.3 kg)     Body mass index is 23.24 kg/m².     Labs:   Lab Results   Component Value Date/Time    WBC 3.7 (L) 04/03/2023 09:47 AM    HGB 13.7 04/03/2023 09:47 AM    .0 04/03/2023 09:47 AM      Lab Results   Component Value Date/Time    GLU 74 04/03/2023 09:47 AM     04/03/2023 09:47 AM    K 4.0 04/03/2023 09:47 AM     04/03/2023 09:47 AM    CO2 26.0 04/03/2023 09:47 AM    CREATSERUM 0.76 04/03/2023 09:47 AM    CA 8.8 04/03/2023 09:47 AM    ALB 3.8 04/03/2023 09:47 AM    TP 7.0 04/03/2023 09:47 AM    ALKPHO 54 04/03/2023 09:47 AM    AST 19 04/03/2023 09:47 AM    ALT 30 04/03/2023 09:47 AM    BILT 0.6 04/03/2023 09:47 AM    TSH 0.991 04/03/2023 09:47 AM        Lab Results   Component Value Date/Time    CHOLEST 245 (H) 04/03/2023 09:47 AM    HDL 93 (H) 04/03/2023 09:47 AM    TRIG 41 04/03/2023 09:47 AM     (H) 04/03/2023 09:47 AM    NONHDLC 152 (H) 04/03/2023 09:47 AM       Lab Results   Component Value Date/Time    A1C 5.5 08/14/2014 08:43 AM      No results found for: \"VITD\"      Health Maintenance   Topic Date Due    Mammogram  04/29/2024       Current Outpatient Medications   Medication Sig Dispense Refill    Meloxicam 15 MG Oral Tab  Take 1 tablet (15 mg total) by mouth daily. 30 tablet 0    Nystatin Does not apply Powder 1 Application by Does not apply route every morning. 1 Bottle 2    diazepam 5 MG Oral Tab Take 5 mg by mouth. 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 Hope)    Meibomian gland dysfunction 2005    Pinguecula 2005      Past Surgical History:   Procedure Laterality Date    BREAST BIOPSY  2005    Negative    CATARACT EXTRACTION W/  INTRAOCULAR LENS IMPLANT Right 06/18/2020    Dr. David Mascorro     EYE SURGERY Right 02/20/2020    Pars Plana Vitrectomy w/ laser and gas fluid exchange  2/20/20 Dr. Hope     LASER SURGERY OF EYE Right 03/30/2020    Dr. Hope- laser for RD    FAUSTO LOCALIZATION WIRE 1 SITE LEFT (CPT=19281)  2005    FAUSTO LOCALIZATION WIRE 1 SITE RIGHT (CPT=19281)      done 2751-5678    NEEDLE BIOPSY LEFT  2009    NEEDLE BIOPSY RIGHT  2003    OTHER SURGICAL HISTORY  March, 2015    excision 'atypical meningioma' thoracic spine    REPAIR RETINAL DETACH, SCLERAL BUCKLE - OD - RIGHT EYE Right 06/25/2020    Dr. Hope -SB with Cryo       Family History   Problem Relation Age of Onset    Diabetes Paternal Grandmother     Cancer Paternal Grandmother         Lung. Non-smoker    Macular degeneration Neg     Glaucoma Neg       Social History:  Social History     Socioeconomic History    Marital status: Single   Tobacco Use    Smoking status: Never    Smokeless tobacco: Never   Substance and Sexual Activity    Alcohol use: No      Alcohol/week: 0.0 standard drinks of alcohol     Comment: minimal    Drug use: No   Other Topics Concern    Caffeine Concern Yes     Comment: Coffee - 3 cups per day     Pt has a pacemaker No    Pt has a defibrillator No    Reaction to local anesthetic No         OB History    Para Term  AB Living   0 0 0 0 0 0   SAB IAB Ectopic Multiple Live Births   0 0 0 0 0        Hx of Pap: all Paps normal           Labs:   Lab Results   Component Value Date/Time    GLU 74 2023 09:47 AM     2023 09:47 AM    K 4.0 2023 09:47 AM     2023 09:47 AM    CO2 26.0 2023 09:47 AM    CREATSERUM 0.76 2023 09:47 AM    CA 8.8 2023 09:47 AM    AST 19 2023 09:47 AM    ALT 30 2023 09:47 AM    TSH 0.991 2023 09:47 AM        Lab Results   Component Value Date/Time    CHOLEST 245 (H) 2023 09:47 AM    HDL 93 (H) 2023 09:47 AM    TRIG 41 2023 09:47 AM     (H) 2023 09:47 AM    NONHDLC 152 (H) 2023 09:47 AM           Pain after sexual activity with .  Not sure if due to vaginal dryness.  Denies discharge nor blood.  Denies history of STDs.  Monogamous with partner.    Hip Pain   The pain is present in the left hip. This is a chronic problem. Episode onset: Seeing orthopedics.  Looking at surgical options on left hip.  Occasional days when bothersome more. There has been no history of extremity trauma. The problem occurs constantly. The problem has been unchanged. The quality of the pain is described as aching. The pain is at a severity of 4/10. Associated symptoms include an inability to bear weight, a limited range of motion and stiffness. Pertinent negatives include no fever, itching, joint locking, joint swelling, numbness or tingling. The symptoms are aggravated by activity and cold. She has tried NSAIDS for the symptoms. The treatment provided moderate relief. Family history does not include gout or rheumatoid  arthritis. Her past medical history is significant for osteoarthritis. There is no history of diabetes, gout or rheumatoid arthritis.         Review of Systems   Constitutional:  Negative for activity change, appetite change, chills, diaphoresis, fatigue, fever and unexpected weight change.   HENT:  Negative for congestion, dental problem, drooling, ear discharge, ear pain, facial swelling, hearing loss, mouth sores, nosebleeds, postnasal drip, rhinorrhea, sinus pressure, sinus pain, sneezing, sore throat, tinnitus, trouble swallowing and voice change.    Eyes:  Negative for photophobia, pain, discharge, redness, itching and visual disturbance.   Respiratory:  Negative for apnea, cough, choking, chest tightness, shortness of breath, wheezing and stridor.    Cardiovascular:  Negative for chest pain, palpitations and leg swelling.   Gastrointestinal:  Negative for abdominal distention, abdominal pain, anal bleeding, blood in stool, constipation, diarrhea, nausea, rectal pain and vomiting.   Endocrine: Negative for cold intolerance, heat intolerance, polydipsia, polyphagia and polyuria.   Genitourinary:  Negative for decreased urine volume, difficulty urinating, dysuria, flank pain, frequency, hematuria, menstrual problem, pelvic pain, urgency, vaginal bleeding, vaginal discharge and vaginal pain.   Musculoskeletal:  Positive for stiffness. Negative for gout.   Skin:  Negative for itching and rash.   Neurological:  Negative for dizziness, tingling, tremors, seizures, syncope, facial asymmetry, speech difficulty, weakness, light-headedness, numbness and headaches.   Psychiatric/Behavioral:  Negative for agitation, behavioral problems, confusion, decreased concentration, dysphoric mood, hallucinations, self-injury, sleep disturbance and suicidal ideas. The patient is not nervous/anxious and is not hyperactive.    All other systems reviewed and are negative.        Current Outpatient Medications   Medication Sig Dispense  Refill    Meloxicam 15 MG Oral Tab Take 1 tablet (15 mg total) by mouth daily. 30 tablet 0    Nystatin Does not apply Powder 1 Application by Does not apply route every morning. 1 Bottle 2    diazepam 5 MG Oral Tab Take 5 mg by mouth. 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 Hope)    Meibomian gland dysfunction 2005    Pinguecula 2005      Past Surgical History:   Procedure Laterality Date    BREAST BIOPSY  2005    Negative    CATARACT EXTRACTION W/  INTRAOCULAR LENS IMPLANT Right 06/18/2020    Dr. David Mascorro     EYE SURGERY Right 02/20/2020    Pars Plana Vitrectomy w/ laser and gas fluid exchange  2/20/20 Dr. Hope     LASER SURGERY OF EYE Right 03/30/2020    Dr. Hope- laser for RD    FAUSTO LOCALIZATION WIRE 1 SITE LEFT (CPT=19281)  2005    FAUSTO LOCALIZATION WIRE 1 SITE RIGHT (CPT=19281)      done 5934-5331    NEEDLE BIOPSY LEFT  2009    NEEDLE BIOPSY RIGHT  2003    OTHER SURGICAL HISTORY  March, 2015    excision 'atypical meningioma' thoracic spine    REPAIR RETINAL DETACH, SCLERAL BUCKLE - OD - RIGHT EYE Right 06/25/2020    Dr. Hope -SB with Cryo       Family History   Problem Relation Age of Onset    Diabetes Paternal Grandmother     Cancer Paternal Grandmother         Lung. Non-smoker    Macular degeneration Neg     Glaucoma Neg       Social History:   Social History     Socioeconomic History    Marital status: Single   Tobacco Use    Smoking status: Never     Smokeless tobacco: Never   Substance and Sexual Activity    Alcohol use: No     Alcohol/week: 0.0 standard drinks of alcohol     Comment: minimal    Drug use: No   Other Topics Concern    Caffeine Concern Yes     Comment: Coffee - 3 cups per day     Pt has a pacemaker No    Pt has a defibrillator No    Reaction to local anesthetic No        Exercise level: exercises 7 times a  week (65571 steps a day a t work. Bike 5 miles 2 times a week) and has been following low salt diet.  Weight has been up. Cooks mor.e Occ. weekends.  Meal prep. Cooks with  without salt.   Wt Readings from Last 3 Encounters:   04/04/24 157 lb 6.4 oz (71.4 kg)   04/03/23 151 lb 3.2 oz (68.6 kg)   07/21/22 149 lb (67.6 kg)     BP Readings from Last 3 Encounters:   04/04/24 126/80   04/03/23 100/64   07/21/22 100/64       Labs:   Lab Results   Component Value Date/Time    GLU 74 04/03/2023 09:47 AM     04/03/2023 09:47 AM    K 4.0 04/03/2023 09:47 AM     04/03/2023 09:47 AM    CO2 26.0 04/03/2023 09:47 AM    CREATSERUM 0.76 04/03/2023 09:47 AM    CA 8.8 04/03/2023 09:47 AM    AST 19 04/03/2023 09:47 AM    ALT 30 04/03/2023 09:47 AM    TSH 0.991 04/03/2023 09:47 AM        Lab Results   Component Value Date/Time    CHOLEST 245 (H) 04/03/2023 09:47 AM    HDL 93 (H) 04/03/2023 09:47 AM    TRIG 41 04/03/2023 09:47 AM     (H) 04/03/2023 09:47 AM    NONHDLC 152 (H) 04/03/2023 09:47 AM          PHYSICAL EXAM:   /80 (BP Location: Right arm, Patient Position: Sitting, Cuff Size: adult)   Pulse 65   Temp 97.9 °F (36.6 °C) (Oral)   Ht 5' 9\" (1.753 m)   Wt 157 lb 6.4 oz (71.4 kg)   LMP 12/17/2014 (Approximate)   SpO2 100%   BMI 23.24 kg/m²   BP Readings from Last 3 Encounters:   04/04/24 126/80   04/03/23 100/64   07/21/22 100/64     Wt Readings from Last 3 Encounters:   04/04/24 157 lb 6.4 oz (71.4 kg)   04/03/23 151 lb 3.2 oz (68.6 kg)   07/21/22 149 lb (67.6 kg)       Physical Exam  Vitals and nursing note reviewed.    Constitutional:       General: She is not in acute distress.     Appearance: Normal appearance. She is well-developed and well-groomed. She is not ill-appearing, toxic-appearing or diaphoretic.      Interventions: She is not intubated.  HENT:      Head: Normocephalic and atraumatic.      Right Ear: Tympanic membrane, ear canal and external ear normal. No decreased hearing noted. No laceration, drainage, swelling or tenderness. No middle ear effusion. There is no impacted cerumen. No foreign body. No mastoid tenderness. No PE tube. No hemotympanum. Tympanic membrane is not injected, scarred, perforated, erythematous, retracted or bulging. Tympanic membrane has normal mobility.      Left Ear: Tympanic membrane, ear canal and external ear normal. No decreased hearing noted. No laceration, drainage, swelling or tenderness.  No middle ear effusion. There is no impacted cerumen. No foreign body. No mastoid tenderness. No PE tube. No hemotympanum. Tympanic membrane is not injected, scarred, perforated, erythematous, retracted or bulging. Tympanic membrane has normal mobility.      Nose:      Right Sinus: No maxillary sinus tenderness or frontal sinus tenderness.      Left Sinus: No maxillary sinus tenderness or frontal sinus tenderness.      Mouth/Throat:      Lips: Pink. No lesions.      Mouth: Mucous membranes are moist. No injury, lacerations, oral lesions or angioedema.      Dentition: Normal dentition. Does not have dentures. No dental tenderness, gingival swelling, dental caries, dental abscesses or gum lesions.      Tongue: No lesions. Tongue does not deviate from midline.      Palate: No mass and lesions.      Pharynx: Oropharynx is clear. Uvula midline. No pharyngeal swelling, oropharyngeal exudate, posterior oropharyngeal erythema or uvula swelling.      Tonsils: No tonsillar exudate or tonsillar abscesses.   Eyes:      General: Lids are normal. No scleral icterus.        Right eye: No foreign body, discharge  or hordeolum.         Left eye: No foreign body, discharge or hordeolum.      Extraocular Movements: Extraocular movements intact.      Right eye: Normal extraocular motion and no nystagmus.      Left eye: Normal extraocular motion and no nystagmus.      Conjunctiva/sclera: Conjunctivae normal.      Right eye: Right conjunctiva is not injected. No chemosis, exudate or hemorrhage.     Left eye: Left conjunctiva is not injected. No chemosis, exudate or hemorrhage.     Pupils: Pupils are equal, round, and reactive to light.   Neck:      Thyroid: No thyroid mass, thyromegaly or thyroid tenderness.      Vascular: Normal carotid pulses. No carotid bruit, hepatojugular reflux or JVD.      Trachea: Trachea and phonation normal. No tracheal tenderness, tracheostomy, abnormal tracheal secretions or tracheal deviation.   Cardiovascular:      Rate and Rhythm: Normal rate and regular rhythm.      Pulses: Normal pulses.           Carotid pulses are 2+ on the right side and 2+ on the left side.       Radial pulses are 2+ on the right side and 2+ on the left side.        Dorsalis pedis pulses are 2+ on the right side and 2+ on the left side.        Posterior tibial pulses are 2+ on the right side and 2+ on the left side.      Heart sounds: Normal heart sounds, S1 normal and S2 normal.   Pulmonary:      Effort: Pulmonary effort is normal. No tachypnea, bradypnea, accessory muscle usage, prolonged expiration, respiratory distress or retractions. She is not intubated.      Breath sounds: Normal breath sounds and air entry. No stridor, decreased air movement or transmitted upper airway sounds. No decreased breath sounds, wheezing, rhonchi or rales.   Chest:      Chest wall: No tenderness.   Breasts:     Breasts are symmetrical.      Right: No swelling, bleeding, inverted nipple, mass, nipple discharge, skin change or tenderness.      Left: No swelling, bleeding, inverted nipple, mass, nipple discharge, skin change or tenderness.    Abdominal:      General: Bowel sounds are normal. There is no distension.      Palpations: Abdomen is soft. Abdomen is not rigid. There is no fluid wave, hepatomegaly, splenomegaly or mass.      Tenderness: There is no abdominal tenderness. There is no right CVA tenderness, left CVA tenderness, guarding or rebound.   Genitourinary:     General: Normal vulva.      Exam position: Lithotomy position.      Pubic Area: No rash or pubic lice.       Labia:         Right: No rash, tenderness, lesion or injury.         Left: No rash, tenderness, lesion or injury.       Urethra: No prolapse, urethral pain, urethral swelling or urethral lesion.      Vagina: Normal. No signs of injury and foreign body. No vaginal discharge, erythema, tenderness, bleeding, lesions or prolapsed vaginal walls.      Cervix: No cervical motion tenderness, discharge, friability, lesion, erythema, cervical bleeding or eversion.      Uterus: Deviated (To post. Some brownish like old menses.). Not enlarged, not fixed, not tender and no uterine prolapse.       Adnexa:         Right: No mass, tenderness or fullness.          Left: No mass, tenderness or fullness.     Musculoskeletal:      Cervical back: Neck supple. No tenderness.      Right lower leg: No edema.      Left lower leg: No edema.   Lymphadenopathy:      Head:      Right side of head: No submental, submandibular, tonsillar, preauricular, posterior auricular or occipital adenopathy.      Left side of head: No submental, submandibular, tonsillar, preauricular, posterior auricular or occipital adenopathy.      Cervical: No cervical adenopathy.      Right cervical: No superficial, deep or posterior cervical adenopathy.     Left cervical: No superficial, deep or posterior cervical adenopathy.      Upper Body:      Right upper body: No supraclavicular, axillary or pectoral adenopathy.      Left upper body: No supraclavicular, axillary or pectoral adenopathy.   Skin:     General: Skin is warm and  dry.      Coloration: Skin is not pale.      Findings: No erythema or rash.   Neurological:      Mental Status: She is alert and oriented to person, place, and time.   Psychiatric:         Mood and Affect: Mood normal.         Speech: Speech normal.         Behavior: Behavior normal. Behavior is cooperative.              ASSESSMENT/PLAN:     Encounter Diagnoses   Name Primary?    High cholesterol Check blood.    Yes    Vaccine counseling Recommend shingles vaccine.  There is 2 doses of the vaccine  by 2 months 6 months apart.  Check on insurance coverage on shingles vaccine.  May be covered better at the pharmacy.  Separate shingles vaccine from all of the vaccines by at least 1 to 2 weeks.  Discussed about side effects of vaccine. Holding for now.        Adult general medical examination Check urine.        Encounter for screening mammogram for malignant neoplasm of breast Check mammogram after 4-29-24. Continue self breast exam every month.         Psoriasis and similar disorder Stable.        History of retinal detachment     Age-related nuclear cataract of left eye FU optho.        Pap smear for cervical cancer screening Check pap and HPV.       Dyspareunia.  Discussed about options.  Try coconut oil.    Vaginal discharge.  Patient says this happened in the past and had seen GYN and pelvic ultrasound and told possibly due to post menopause.  Same as happened in the past.    Postmenopausal.  Check DEXA scan. Take calcium 600 every 12 hrs. With vitamin D 400 IU every  12 hrs. Excerise at least 30 minutes 3-4 times a week. May use calcium citrate as opposed to calcium carbonate which may be better absorbed in the setting of PPI use.   lifelong physical activity at all ages is strongly endorsed by the [employer]opor[employer]. Exercise recommendations generally should include weight-bearing, muscle-strengthening, and balance training exercises for 30 minutes 5 days per week or 75 minutes twice  weekly, often consistent with other general health recommendations.   Weight Bearing  There are two types of osteoporosis exercises that are important for building and maintaining bone density: weight-bearing and muscle-strengthening exercises.  Weight-bearing Exercises  These exercises include activities that make you move against gravity while staying upright. Weight-bearing exercises can be high-impact or low-impact.  High-impact weight-bearing exercises help build bones and keep them strong. If you have broken a bone due to osteoporosis or are at risk of breaking a bone, you may need to avoid high-impact exercises. If you’re not sure, you should check with your healthcare provider.  Examples of high-impact weight-bearing exercises are:  Dancing   Doing high-impact aerobics   Hiking   Jogging/running   Jumping Rope   Stair climbing   Tennis  Low-impact weight-bearing exercises can also help keep bones strong and are a safe alternative if you cannot do high-impact exercises. Examples of low-impact weight-bearing exercises are:  Using elliptical training machines   Doing low-impact aerobics   Using stair-step machines   Fast walking on a treadmill or outside         Orders Placed This Encounter   Procedures    Lipid Panel    CBC With Differential With Platelet    Comp Metabolic Panel (14)    TSH W Reflex To Free T4    URINALYSIS, AUTO, W/O SCOPE    ThinPrep PAP with HPV Reflex Request B       Meds This Visit:  Requested Prescriptions      No prescriptions requested or ordered in this encounter       Imaging & Referrals:  [facility] KOFI 2D+3D SCREENING BILAT (CPT=77067/80317)  XR DEXA BONE DENSITOMETRY (CPT=77080)      RTC 1 yr. for physical.

## 2024-04-04 NOTE — PATIENT INSTRUCTIONS
ASSESSMENT/PLAN:     Encounter Diagnoses   Name Primary?    High cholesterol Check blood.    Yes    Vaccine counseling Recommend shingles vaccine.  There is 2 doses of the vaccine  by 2 months 6 months apart.  Check on insurance coverage on shingles vaccine.  May be covered better at the pharmacy.  Separate shingles vaccine from all of the vaccines by at least 1 to 2 weeks.  Discussed about side effects of vaccine. Holding for now.        Adult general medical examination Check urine.        Encounter for screening mammogram for malignant neoplasm of breast Check mammogram after 4-29-24. Continue self breast exam every month.         Psoriasis and similar disorder Stable.        History of retinal detachment     Age-related nuclear cataract of left eye FU optho.        Pap smear for cervical cancer screening Check pap and HPV.       Dyspareunia.  Discussed about options.  Try coconut oil.    Vaginal discharge.  Patient says this happened in the past and had seen GYN and pelvic ultrasound and told possibly due to post menopause.  Same as happened in the past.    Postmenopausal.  Check DEXA scan. Take calcium 600 every 12 hrs. With vitamin D 400 IU every  12 hrs. Excerise at least 30 minutes 3-4 times a week. May use calcium citrate as opposed to calcium carbonate which may be better absorbed in the setting of PPI use.   lifelong physical activity at all ages is strongly endorsed by the National Osteoporosis Foundation. Exercise recommendations generally should include weight-bearing, muscle-strengthening, and balance training exercises for 30 minutes 5 days per week or 75 minutes twice weekly, often consistent with other general health recommendations. lifelong physical activity at all ages is strongly endorsed by the National Osteoporosis Foundation.  Weight Bearing  There are two types of osteoporosis exercises that are important for building and maintaining bone density: weight-bearing and  muscle-strengthening exercises.  Weight-bearing Exercises  These exercises include activities that make you move against gravity while staying upright. Weight-bearing exercises can be high-impact or low-impact.  High-impact weight-bearing exercises help build bones and keep them strong. If you have broken a bone due to osteoporosis or are at risk of breaking a bone, you may need to avoid high-impact exercises. If you’re not sure, you should check with your healthcare provider.  Examples of high-impact weight-bearing exercises are:  Dancing   Doing high-impact aerobics   Hiking   Jogging/running   Jumping Rope   Stair climbing   Tennis  Low-impact weight-bearing exercises can also help keep bones strong and are a safe alternative if you cannot do high-impact exercises. Examples of low-impact weight-bearing exercises are:  Using elliptical training machines   Doing low-impact aerobics   Using stair-step machines   Fast walking on a treadmill or outside         Orders Placed This Encounter   Procedures    Lipid Panel    CBC With Differential With Platelet    Comp Metabolic Panel (14)    TSH W Reflex To Free T4    URINALYSIS, AUTO, W/O SCOPE    ThinPrep PAP with HPV Reflex Request B       Meds This Visit:  Requested Prescriptions      No prescriptions requested or ordered in this encounter       Imaging & Referrals:  [facility] KOFI 2D+3D SCREENING BILAT (CPT=77067/29374)  XR DEXA BONE DENSITOMETRY (CPT=77080)      RTC 1 yr. for physical.

## 2024-04-05 NOTE — PROGRESS NOTES
CBC Normal (white blood cells and red blood cells and platelets),   CMP Normal (electrolytes, sugar, kidney and liver functions),   Lipid (choilesterol) is worse.  Goal LDL should be less than 100.Careful with diet.  Avoid red meat, shellfish, pork.  Try not to gracia foods.  Lower carb diet.  Exercise is most part at least 30 minutes 3-4 times a week sustained cardiovascular aerobic exercise getting that heart rate going and keeping it going for 30 minutes at a time.  If cannot do 30 will start with 10 minutes of brisk walking is good at each week build up 5 minutes more.  Recheck lipid in 3 months.  Orders written.  Possibility of trying a statin i.e. Lipitor 40 mg at night if okay can send to the pharmacy?  Thyroid is good.   Urine looks okay.

## 2024-04-08 ENCOUNTER — TELEPHONE (OUTPATIENT)
Dept: INTERNAL MEDICINE CLINIC | Facility: CLINIC | Age: 64
End: 2024-04-08

## 2024-04-08 NOTE — TELEPHONE ENCOUNTER
Patient called back, states she spoke with Christiane HOFFMANN about her lab results.  She declines statin for now, will work on diet, exercise and recheck lipids in 3 months, then decide.  FYI to Dr Peñaloza.

## 2024-04-08 NOTE — TELEPHONE ENCOUNTER
Patient called, stating she had missed a call from SHUN Iniguez about labs and that Dr. Peñaloza had wanted to start her on statins.    Got disconnected when trying to transfer to nurse. I attempted to redial her and had to leave a voicemail.    Please call patient back.

## 2024-04-08 NOTE — TELEPHONE ENCOUNTER
On exam noticed some brownish discharge likely dried blood not sure if she needs to come in to get reevaluated she had a workup in the past with a pelvic ultrasound which did not show any thickening of the endometrial lining.  Had seen in the past.

## 2024-04-09 NOTE — TELEPHONE ENCOUNTER
She saw Augusto Moya in the past with reassuring findings.  Sounds minimal and likely atrophic vaginitis or friction.  Recent intercourse or trauma?  Can try a vaginal moisturizer.  If you're concerned, can order pelvic TV ultrasound and if endometrial thickness <4 mm nothing further.

## 2024-04-09 NOTE — TELEPHONE ENCOUNTER
Left message to call back-transfer to triage.     METABOLIC PANEL       Prior to Admission medications    Medication Sig Start Date End Date Taking? Authorizing Provider   losartan-hydrochlorothiazide [facility]) 100-12.5 MG per tablet Take 1 tablet by mouth daily 4/30/19  Yes Marylu Blake DO   NIFEdipine (PROCARDIA XL) 90 MG extended release tablet Take 1 tablet by mouth daily 4/30/19  Yes Marylu Blake DO   nabumetone (RELAFEN) 500 MG tablet Take 1 tablet by mouth 2 times daily 4/16/19  Yes Marylu Kimble MD   Homeopathic Products (SPEEDGEL) GEL Formula #8E Baclo 2%, Diclo 3%, DMSO 5%, Paradise 6%, Lido 2%, prilo 2% Pharm to comp 3/12/19  Yes Valentino Leventhal Wigger, PA   ASPIRIN ADULT LOW STRENGTH 81 MG EC tablet TAKE ONE TABLET BY MOUTH DAILY 10/10/18  Yes Marylu Blake DO   Omega-3 Fatty Acids (FISH OIL) 1000 MG CAPS Take 3,000 mg by mouth 3 times daily   Yes Historical Provider, MD   Multiple Vitamins-Minerals (THERAPEUTIC MULTIVITAMIN-MINERALS) tablet Take 1 tablet by mouth daily   Yes Historical Provider, MD   omeprazole (PRILOSEC) 40 MG delayed release capsule TAKE ONE CAPSULE BY MOUTH EVERY MORNING BEFORE BREAKFAST AS NEEDED (REFLUX) 11/21/18   Marylu Blake DO   Diclofenac Sodium (VOLTAREN PO) Take by mouth    Historical Provider, MD       Past Medical History:   Diagnosis Date    Arthritis     Hypertension        Past Surgical History:   Procedure Laterality Date    KNEE SURGERY      right mcl 30 years ago       Social History     Tobacco Use    Smoking status: Never Smoker    Smokeless tobacco: Former User     Types: Chew   Substance Use Topics    Alcohol use: Yes     Frequency: 2-3 times a week     Drinks per session: 3 or 4     Binge frequency: Less than monthly     Comment: three times weekly       History reviewed. No pertinent family history.

## 2024-04-09 NOTE — TELEPHONE ENCOUNTER
Recommend repeat ultrasound if less than 4 mm lining of the uterus then no further testing to be done.  Not sure if she was sexually active before her appointment with me which would explain the brownish discharge?

## 2024-04-10 NOTE — TELEPHONE ENCOUNTER
Patient notified of plan below - she was not recently sexually active prior to visit.     Patient advised US order in and she should call to schedule. Provided info and if questions patient to call us back.

## 2024-04-25 LAB
HPV I/H RISK 1 DNA SPEC QL NAA+PROBE: NEGATIVE
LAST PAP RESULT: NORMAL
PAP HISTORY (OTHER THAN LAST PAP): NORMAL

## 2024-04-30 ENCOUNTER — HOSPITAL ENCOUNTER (OUTPATIENT)
Dept: ULTRASOUND IMAGING | Facility: HOSPITAL | Age: 64
Discharge: HOME | End: 2024-04-30
Attending: INTERNAL MEDICINE
Payer: COMMERCIAL

## 2024-04-30 DIAGNOSIS — N89.8 VAGINAL DISCHARGE: ICD-10-CM

## 2024-04-30 PROCEDURE — 76856 US EXAM PELVIC COMPLETE: CPT | Performed by: INTERNAL MEDICINE

## 2024-04-30 PROCEDURE — 76830 TRANSVAGINAL US NON-OB: CPT | Performed by: INTERNAL MEDICINE

## 2024-05-09 ENCOUNTER — HOSPITAL ENCOUNTER (OUTPATIENT)
Dept: MAMMOGRAPHY | Facility: HOSPITAL | Age: 64
Discharge: HOME | End: 2024-05-09
Attending: INTERNAL MEDICINE
Payer: COMMERCIAL

## 2024-05-09 DIAGNOSIS — Z12.31 ENCOUNTER FOR SCREENING MAMMOGRAM FOR MALIGNANT NEOPLASM OF BREAST: ICD-10-CM

## 2024-05-09 PROCEDURE — 77067 SCR MAMMO BI INCL CAD: CPT | Performed by: INTERNAL MEDICINE

## 2024-05-09 PROCEDURE — 77063 BREAST TOMOSYNTHESIS BI: CPT | Performed by: INTERNAL MEDICINE

## 2024-05-15 ENCOUNTER — HOSPITAL ENCOUNTER (OUTPATIENT)
Dept: BONE DENSITY | Facility: HOSPITAL | Age: 64
Discharge: HOME | End: 2024-05-15
Attending: INTERNAL MEDICINE

## 2024-05-15 DIAGNOSIS — Z78.0 POSTMENOPAUSAL: ICD-10-CM

## 2024-05-15 PROCEDURE — 77080 DXA BONE DENSITY AXIAL: CPT | Performed by: INTERNAL MEDICINE

## 2024-05-17 ENCOUNTER — HOSPITAL ENCOUNTER (OUTPATIENT)
Dept: MAMMOGRAPHY | Facility: HOSPITAL | Age: 64
Discharge: HOME | End: 2024-05-17
Attending: INTERNAL MEDICINE

## 2024-05-17 ENCOUNTER — HOSPITAL ENCOUNTER (OUTPATIENT)
Dept: ULTRASOUND IMAGING | Facility: HOSPITAL | Age: 64
Discharge: HOME | End: 2024-05-17
Attending: INTERNAL MEDICINE

## 2024-05-17 DIAGNOSIS — R92.8 ABNORMAL MAMMOGRAM OF RIGHT BREAST: ICD-10-CM

## 2024-05-17 PROCEDURE — 77065 DX MAMMO INCL CAD UNI: CPT | Performed by: INTERNAL MEDICINE

## 2024-05-17 PROCEDURE — 77061 BREAST TOMOSYNTHESIS UNI: CPT | Performed by: INTERNAL MEDICINE

## 2024-05-17 PROCEDURE — 76642 ULTRASOUND BREAST LIMITED: CPT | Performed by: INTERNAL MEDICINE

## 2024-05-17 NOTE — DISCHARGE INSTRUCTIONS
The Doctor (Radiologist) who performed your procedure was: DR QUINTANILLA    Place an ice pack over the biopsy site on top of your bra or on top of the ACE wrap (never apply ice directly over skin) for 10-15 minutes of every hour until bedtime for your comfort and to decrease bleeding.  Keep your sports bra or the ACE wrap (stereotactic and MRI biopsy) in place for 24 hours after your biopsy. This compression decreases bleeding and breast movement for your comfort. Wear a supportive bra for the next couple of days for comfort (sports bra for sleep).   Continue to wear, preferably, a sports bra or good supportive bra for 1 week and take off only to shower.  No baths or showers during the first 24 hours after biopsy. After this time you may take a shower. It's okay if the strips get wet but do not soak them. NO saunas, hot tubs or swimming until steri-strips fall off (approx. 5 days). This prevents infection and allows time for them to completely close and heal.  DO NOT remove the steri-strips. They will fall off in 5 days. If any type of irritation (redness, itching or blisters) develops in the area around the steri-strips, remove them gently. If the steri-strips do not fall off after 5 days, gently remove them. Keep the area clean and dry.  It is normal to have mild discomfort and bruising at the biopsy site.  You may take Tylenol as needed for discomfort, as long as you have no allergies to Tylenol. Do not take aspirin, motrin, ibuprofen or any medication containing NSAID (non-steroidal anti-inflammatory drug) product for 48 hours.  DO NOT participate in strenuous activity (aerobics, heavy lifting, housework, gardening, etc.) 48 hours after your biopsy to prevent bleeding.  You will receive results in 2-3 business days.  If you are having an MRI breast biopsy or an Ultrasound guided breast biopsy, you will be billed for the biopsy and unilateral mammogram separately.  If you have any questions about the procedure or  your results, please contact the Breast Care Coordinator Nurse at [phone].  Notify your ordering physician or primary physician for increased bleeding, pain or fever over 100. Or contact a Radiology Nurse at [phone] between 8am-4pm (after 4pm, your call will be directed to the [facility]).

## 2024-05-17 NOTE — IMAGING NOTE
Discussed recommended breast biopsy with patient.  Patient is being recommended for stereotactic biopsy of the right  Breast  disortion  by Dr. Lopez. Has super orders placed. Was provided Thursday 5/23 checking in at 915 am.  Pt history discussed as below:  Pt history of biopsy:yes bilateral benign        Family history of cancer: no   Pt history of breast cancer:no   Hx BCP use:        no             HRT use:    no   RECOMMENDATIONS:   STEREOTACTIC BREAST BIOPSY: RIGHT BREAST  x1     Recommedations :                      see [facility] for dictated radiology report   Reviewed pertinent patient history, family history of cancer, and patient medications  Discussed with patient Radiology’s protocol regarding medications, as well as over-the-counter vitamin or herbal supplements, as follows:  -All herbal supplements, Vitamin E, Fish Oil    -All NSAIDs (Ibuprofen, Motrin, Advil, Aleve, or other antiinflammatory medication)  and Aspirin  81mg currently being taken    not  recommended or prescribed by  your physician  should be held for 5  days prior to biopsy.  Denies usage  -Aspirin 81 mg being taken related to a cardiac condition  or prescribed by your  physician should be held at the  direction of your physician.  Informed patient to call ordering physician for guidelines denies usage    -Blood thinners/antiplatelet medications (Coumadin, Plavix  ect) should be held at the  direction of your physician.  Informed patient to call ordering physician for guidelines denies usage   Reviewed Stereotactic biopsy procedure, as below.  For this procedure,   stereotactic biopsy is being performed with tomosynthesis , you will sitting upright  with your breast in compression.  Mammography imaging will be used to locate the area in question that was seen on your previous breast imaging.  The Radiologist will then inject a local numbing medication into the area and then use a needle to collect cells from the site.  A marker, or clip,  will then be placed in the biopsied area.  This marker is placed so this biopsy site is able to be accurately located upon future breast imaging.  After the clip is placed,  a dressing will be placed on the biopsy site.  Additional mammography films will then be taken to assure correct placement of the marker.    Educated the patient they will be awake during this procedure and are able to drive themselves home if they wish.    Educated patient that some soreness may occur after biopsy.  Discussed use of a supportive bra and ice packs after procedure, to decrease soreness.    Discussed with patient no swimming, bathing,  hot tubs , or submerging underwater for 5 days and   until the incision is closed and healed.  Educated patient on lifting restrictions - nothing heavier than a gallon of milk for  48 hours after the procedure.      Discussed with patient that some soreness and bruising is normal after biopsy but that prolonged or increased pain and bruising should be reported to the ordering physician.  An ace wrap will be applied over bra for added support and should be left on for 24 hours post procedure.  Reviewed results process with patient and shared that pathology results will be available within 2-3 business days of their biopsy.  Discussed results will be communicated by their ordering physician unless otherwise indicated.  Educated patient that once we receive an order from their physician our radiology secretaries will be calling them to schedule their biopsy.

## 2024-05-23 ENCOUNTER — HOSPITAL ENCOUNTER (OUTPATIENT)
Dept: MAMMOGRAPHY | Facility: HOSPITAL | Age: 64
Discharge: HOME | End: 2024-05-23
Attending: INTERNAL MEDICINE

## 2024-05-23 DIAGNOSIS — N64.89 DISTORTION OF CONTOUR OF BREAST: ICD-10-CM

## 2024-05-23 PROCEDURE — 19081 BX BREAST 1ST LESION STRTCTC: CPT | Performed by: INTERNAL MEDICINE

## 2024-05-23 PROCEDURE — 88305 TISSUE EXAM BY PATHOLOGIST: CPT | Performed by: INTERNAL MEDICINE

## 2024-05-23 NOTE — PROCEDURES
[facility]  part of [facility]  Procedure Note    Carolina TERRELL Ryan Patient Status:  Outpatient    3/16/1960 MRN [id number]   Location [facility] MAMMOGRAPHY [facility] [place] [facility] Attending Annette Peñaloza MD   Hosp Day # 0 PCP Annette Peñaloza MD     Procedure: Right breast stereotactic/ryann guided biopsy    Pre-Procedure Diagnosis:  Right breast mammographic area of questionable architectural distortion    Post-Procedure Diagnosis: Right breast mammographic area of questionable architectural distortion    Anesthesia:  Local    Findings:  Right breast mammographic area of questionable architectural distortion    Specimens: multiple cores    Blood Loss:  minimal    Tourniquet Time: none  Complications:  None  Drains:  none    Shira Alvarado MD  2024

## 2024-05-24 ENCOUNTER — TELEPHONE (OUTPATIENT)
Dept: MAMMOGRAPHY | Facility: HOSPITAL | Age: 64
End: 2024-05-24

## 2024-05-24 NOTE — TELEPHONE ENCOUNTER
Carolina Davis is s/p biopsy .  Phoned and introduced myself as breast coordinator .  Reinforced to patient  post biopsy care and instructions . NO c/o     Informed  and shared the pathology results as well as the recommendations from Dr Alvarado for her breast imaging  as follows:      Pathology results shared (see epic for dictated pathology and radiology procedure report)  and recommendations are as follows:       Pathology demonstrates benign findings and is concordant with imaging.       RECOMMENDATION:  Six-month follow-up right breast diagnostic mammogram                Carolina Davisacknowledges the above and denies questions. Carolina Davis was also instructed to perform breast self exams and if any changes  develops any changes to contact ordering  physician immediately  for re evaluation..  Carolina Davisverbalizes understanding and agreement.

## 2024-06-27 ENCOUNTER — OFFICE VISIT (OUTPATIENT)
Dept: OBGYN CLINIC | Facility: CLINIC | Age: 64
End: 2024-06-27

## 2024-06-27 VITALS
WEIGHT: 156.13 LBS | BODY MASS INDEX: 23 KG/M2 | HEART RATE: 80 BPM | SYSTOLIC BLOOD PRESSURE: 124 MMHG | DIASTOLIC BLOOD PRESSURE: 81 MMHG

## 2024-06-27 DIAGNOSIS — N95.2 ATROPHIC VAGINITIS: ICD-10-CM

## 2024-06-27 DIAGNOSIS — R93.5 ABNORMAL ULTRASOUND OF ENDOMETRIUM: Primary | ICD-10-CM

## 2024-06-27 DIAGNOSIS — N94.12 DEEP DYSPAREUNIA IN FEMALE: ICD-10-CM

## 2024-06-27 PROCEDURE — 99203 OFFICE O/P NEW LOW 30 MIN: CPT | Performed by: OBSTETRICS & GYNECOLOGY

## 2024-06-27 PROCEDURE — 3074F SYST BP LT 130 MM HG: CPT | Performed by: OBSTETRICS & GYNECOLOGY

## 2024-06-27 PROCEDURE — 3079F DIAST BP 80-89 MM HG: CPT | Performed by: OBSTETRICS & GYNECOLOGY

## 2024-06-27 NOTE — PROGRESS NOTES
Subjective:   Patient ID: Carolina Davis is a 64 year old female.    HPI  Nulligravida and  with last menstrual period in 2014. No bleeding since and no HRT ever used. She had annual exam with Dr Peñaloza and she noted a brownish DC somewhat suspicious for old blood. She ordered ultrasound and endo thickness 1-2 mm but possible fluid collection w/o measurement. No bleeding / pain. Carolina is in 40 year relationship. She is a  and  for a small family run company.     History/Other:   Review of Systems   Constitutional:  Negative for appetite change, fatigue and unexpected weight change.   Eyes:  Negative for visual disturbance.   Respiratory:  Negative for cough and shortness of breath.    Cardiovascular:  Negative for chest pain, palpitations and leg swelling.   Gastrointestinal:  Negative for abdominal distention, abdominal pain, blood in stool, constipation and diarrhea.   Genitourinary:  Positive for dyspareunia (deep. Dryness as well. Lubricants minimally help now.). Negative for dysuria, frequency, menstrual problem, pelvic pain and urgency.   Musculoskeletal:  Negative for arthralgias and myalgias.   Skin:  Negative for rash.   Neurological:  Negative for weakness, numbness and headaches.   Psychiatric/Behavioral:  Negative for dysphoric mood. The patient is not nervous/anxious.      Current Outpatient Medications   Medication Sig Dispense Refill    Nystatin Does not apply Powder 1 Application by Does not apply route every morning. 1 Bottle 2    Meloxicam 15 MG Oral Tab Take 1 tablet (15 mg total) by mouth daily. (Patient not taking: Reported on 6/27/2024) 30 tablet 0    diazepam 5 MG Oral Tab Take 5 mg by mouth. (Patient not taking: Reported on 4/3/2023)       Allergies:No Known Allergies    Objective:   Physical Exam  Constitutional:       General: She is not in acute distress.     Appearance: Normal appearance. She is normal weight. She is not ill-appearing.   Genitourinary:      Comments: EG, vagina and cervix w/o lesions. Marked atrophy c/w age, body habitus and nulliparity. Cervix markedly stenotic and I could not pass even a cytobrush. No blood. Uterus atrophic and no adnexal mass / tenderness.       Neurological:      Mental Status: She is alert.         Assessment & Plan:   1. Abnormal ultrasound of endometrium    2. Atrophic vaginitis    3. Deep dyspareunia in female        PLAN: Repeat ultrasound in July to check on possible fluid. If still present, then needs endometrial sampling though office EB may be impossible even with Cytotec.     Meds This Visit:  Requested Prescriptions      No prescriptions requested or ordered in this encounter       Imaging & Referrals:  US PELVIS W EV (CPT=76856/06646)

## 2024-07-18 ENCOUNTER — TELEPHONE (OUTPATIENT)
Dept: OBGYN CLINIC | Facility: CLINIC | Age: 64
End: 2024-07-18

## 2024-07-18 NOTE — TELEPHONE ENCOUNTER
Patient called needs to know if she should schedule appointment after the ultra sound please call to advise

## 2024-07-18 NOTE — TELEPHONE ENCOUNTER
Office notes reviewed, patient may need possible eMBX depending on ultrasound results.     Left message to call back

## 2024-07-18 NOTE — TELEPHONE ENCOUNTER
Patient called and informed that Dr. Olivera would want to await results and then will give recommendations on what sort of appointment she would need. Patient states understanding.

## 2024-08-06 ENCOUNTER — HOSPITAL ENCOUNTER (OUTPATIENT)
Dept: ULTRASOUND IMAGING | Facility: HOSPITAL | Age: 64
Discharge: HOME | End: 2024-08-06
Attending: OBSTETRICS & GYNECOLOGY
Payer: COMMERCIAL

## 2024-08-06 DIAGNOSIS — R93.5 ABNORMAL ULTRASOUND OF ENDOMETRIUM: ICD-10-CM

## 2024-08-06 PROCEDURE — 76856 US EXAM PELVIC COMPLETE: CPT | Performed by: OBSTETRICS & GYNECOLOGY

## 2024-08-06 PROCEDURE — 76830 TRANSVAGINAL US NON-OB: CPT | Performed by: OBSTETRICS & GYNECOLOGY

## 2024-09-09 ENCOUNTER — TELEPHONE (OUTPATIENT)
Dept: OBGYN CLINIC | Facility: CLINIC | Age: 64
End: 2024-09-09

## 2024-09-09 PROBLEM — H33.009 RETINAL DETACHMENT WITH RETINAL DEFECT: Chronic | Status: ACTIVE | Noted: 2022-08-23

## 2024-09-09 PROBLEM — T85.398A EXTRUDING SCLERAL BUCKLE: Status: ACTIVE | Noted: 2024-09-09

## 2024-09-09 NOTE — TELEPHONE ENCOUNTER
Patient states Dr. Hwang ordered an ultrasound and she had it done 8/6 and never heard back regarding next steps or results. Please advise

## 2024-09-12 NOTE — TELEPHONE ENCOUNTER
Patient calling upset. States she has been waiting to hear from Dr. Olivera regarding pelvic ultrasound completed in August. Patient reached out then via [employer]. Message routed to Dr. Olivera as well as recent call.     Dr. Olivera recommendations in 6/27/2024 at office visit are as follows:   \"PLAN: Repeat ultrasound in July to check on possible fluid. If still present, then needs endometrial sampling though office EB may be impossible even with Cytotec.\"    Patient is asking if another provider can read the results.     To Dr. Raymundo on-call, please review and advise on Dr. Olivera absence.

## 2024-09-12 NOTE — TELEPHONE ENCOUNTER
Informed patient of Dr. Raymundo message below. Informed patient Dr. Olivera is back in the office on Monday (9/16/24) and message will be routed to him for his recommendations. Patient very appreciative and verbalized understanding.

## 2024-09-18 ENCOUNTER — TELEPHONE (OUTPATIENT)
Dept: OBGYN CLINIC | Facility: CLINIC | Age: 64
End: 2024-09-18

## 2024-09-18 NOTE — TELEPHONE ENCOUNTER
Patient called states she is still waiting for a call back about the results of the test done on 8-6-24. What is the results and what are her next steps. Please call

## 2024-10-22 ENCOUNTER — OFFICE VISIT (OUTPATIENT)
Dept: OBGYN CLINIC | Facility: CLINIC | Age: 64
End: 2024-10-22
Payer: COMMERCIAL

## 2024-10-22 VITALS
DIASTOLIC BLOOD PRESSURE: 83 MMHG | SYSTOLIC BLOOD PRESSURE: 145 MMHG | WEIGHT: 159.19 LBS | BODY MASS INDEX: 24 KG/M2 | HEART RATE: 71 BPM

## 2024-10-22 DIAGNOSIS — R93.5 ABNORMAL ULTRASOUND OF ENDOMETRIUM: Primary | ICD-10-CM

## 2024-10-22 PROCEDURE — 3079F DIAST BP 80-89 MM HG: CPT | Performed by: OBSTETRICS & GYNECOLOGY

## 2024-10-22 PROCEDURE — 99213 OFFICE O/P EST LOW 20 MIN: CPT | Performed by: OBSTETRICS & GYNECOLOGY

## 2024-10-22 PROCEDURE — 3077F SYST BP >= 140 MM HG: CPT | Performed by: OBSTETRICS & GYNECOLOGY

## 2024-10-22 RX ORDER — MISOPROSTOL 200 UG/1
200 TABLET ORAL ONCE
Qty: 1 TABLET | Refills: 0 | Status: SHIPPED | OUTPATIENT
Start: 2024-10-22 | End: 2024-10-22

## 2024-10-22 NOTE — PROGRESS NOTES
Carolina Davis is a 64 year old female  Patient's last menstrual period was 2014 (approximate).   Chief Complaint   Patient presents with    Consult     Discuss Ultrasound per patient    Presenting for consult regarding US results. She had an US completed 24 that showed an endometrial lining of 2 mm with a fluid collection. US at that time was completed for brown discharge. Repeat US was completed 2024 and shows a continued fluid collection in the lining. We discussed recommendation for endometrial biopsy.     OBSTETRICS HISTORY:  OB History    Para Term  AB Living   0 0 0 0 0 0   SAB IAB Ectopic Multiple Live Births   0 0 0 0 0       GYNE HISTORY:  Patient's last menstrual period was 2014 (approximate).    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 Hope)    Meibomian gland dysfunction    Pinguecula         SURGICAL HISTORY:  Past Surgical History:   Procedure Laterality Date    Breast biopsy  2005    Negative    Cataract extraction w/  intraocular lens implant Right 2020    Dr. David Mascorro     Eye surgery Right 2020    Pars Plana Vitrectomy w/ laser and gas fluid exchange  20 Dr. Hope     Laser surgery of eye Right 2020    Dr. Hope- laser for RD    Apollo biopsy stereo nodule 1 site right (cpt=19081)  2024     top hat    Apollo localization wire 1 site left (cpt=19281)  2005    Apollo localization wire 1 site right (cpt=19281)      done 2822-8165    Needle biopsy left  2009    Needle biopsy right  2003    Other surgical history  03/2015    excision 'atypical meningioma' thoracic spine    Repair retinal detach, scleral buckle - od - right eye Right 06/25/2020    Dr. Hope -SB with Cryo        SOCIAL HISTORY:  Social History     Socioeconomic History    Marital status: Single     Spouse name: Not on file    Number of children: Not on file    Years of education: Not on file    Highest education level: Not on file   Occupational History    Not on file   Tobacco Use    Smoking status: Never    Smokeless tobacco: Never   Substance and Sexual Activity    Alcohol use: No     Alcohol/week: 0.0 standard drinks of alcohol     Comment: minimal    Drug use: No    Sexual activity: Not on file   Other Topics Concern     Service Not Asked    Blood Transfusions Not Asked    Caffeine Concern Yes     Comment: Coffee - 3 cups per day     Occupational Exposure Not Asked    Hobby Hazards Not Asked    Sleep Concern Not Asked    Stress Concern Not Asked    Weight Concern Not Asked    Special Diet Not Asked    Back Care Not Asked    Exercise Not Asked    Bike Helmet Not Asked    Seat Belt Not Asked    Self-Exams Not Asked    Grew up on a farm Not Asked    History of tanning Not Asked    Outdoor occupation Not Asked    Pt has a pacemaker No    Pt has a defibrillator No    Breast feeding Not Asked    Reaction to local anesthetic No   Social History Narrative    Not on file     Social Drivers of Health     Financial Resource Strain: Not on file   Food Insecurity: Not on file   Transportation Needs: Not on file   Physical Activity: Not on file   Stress: Not on file   Social Connections: Not on file   Housing Stability: Not on file         Depression Screening (PHQ-2/PHQ-9): Over the LAST 2 WEEKS   Little interest or pleasure in doing things  (over the last two weeks)?: Not at all    Feeling down, depressed, or hopeless (over the last two weeks)?: Not at all    PHQ-2 SCORE: 0           MEDICATIONS:    Current Outpatient Medications:     miSOPROStol (CYTOTEC) 200 MCG Oral Tab, Take 1 tablet (200 mcg total) by mouth one time for 1 dose. Take the night prior to endometrial biopsy., Disp: 1 tablet, Rfl: 0    Nystatin Does not apply Powder, 1 Application by Does not apply route every morning., Disp: 1 Bottle, Rfl: 2    Meloxicam 15 MG Oral Tab, Take 1 tablet (15 mg total) by mouth daily. (Patient not taking: Reported on 10/22/2024), Disp: 30 tablet, Rfl: 0    diazepam 5 MG Oral Tab, Take 5 mg by mouth. (Patient not taking: Reported on 4/3/2023), Disp: , Rfl:     ALLERGIES:  Allergies[1]      Review of Systems:  Review of Systems   All other systems reviewed and are negative.       Vitals:    10/22/24 1106   BP: 145/83   Pulse: 71       PHYSICAL EXAM:   Constitutional: well developed, well nourished  Head/Face: normocephalic  Skin/Hair: no unusual rashes or bruises  Extremities: no edema, no cyanosis  Psychiatric:  Oriented to time, place, person and situation. Appropriate mood and affect      Assessment & Plan:  Carolina was seen today for consult.    Diagnoses and all orders for this visit:    Abnormal ultrasound of endometrium    Other orders  -     miSOPROStol (CYTOTEC) 200 MCG Oral Tab; Take 1 tablet (200 mcg total) by mouth one time for 1 dose. Take the night prior to endometrial biopsy.        Requested Prescriptions     Signed Prescriptions Disp Refills    miSOPROStol (CYTOTEC) 200 MCG Oral Tab 1 tablet 0     Sig: Take 1 tablet (200 mcg total) by mouth one time for 1 dose. Take the night prior to endometrial biopsy.         RTC for endometrial biopsy. Cytotec to be taken night prior to biopsy.         [1] No Known Allergies

## 2024-12-18 ENCOUNTER — OFFICE VISIT (OUTPATIENT)
Dept: OBGYN CLINIC | Facility: CLINIC | Age: 64
End: 2024-12-18
Payer: COMMERCIAL

## 2024-12-18 VITALS
DIASTOLIC BLOOD PRESSURE: 86 MMHG | BODY MASS INDEX: 23 KG/M2 | HEART RATE: 75 BPM | WEIGHT: 158 LBS | SYSTOLIC BLOOD PRESSURE: 131 MMHG

## 2024-12-18 DIAGNOSIS — R93.89 THICKENED ENDOMETRIUM: Primary | ICD-10-CM

## 2024-12-18 PROCEDURE — 3079F DIAST BP 80-89 MM HG: CPT | Performed by: OBSTETRICS & GYNECOLOGY

## 2024-12-18 PROCEDURE — 3075F SYST BP GE 130 - 139MM HG: CPT | Performed by: OBSTETRICS & GYNECOLOGY

## 2024-12-18 PROCEDURE — 58100 BIOPSY OF UTERUS LINING: CPT | Performed by: OBSTETRICS & GYNECOLOGY

## 2024-12-18 NOTE — PROCEDURES
Endometrial Biopsy    Pre-Procedure Care:   Consent was obtained.  Procedure/risks were explained.  Questions were answered.  Correct patient was identified.  Correct side and site were confirmed.      Pre-Medications:    The patient was premedicated with Ibuprofen .    Description of Procedure:  Under satisfactory analgesia, the patient was prepped and draped in the dorsal lithotomy position.   A bivalve speculum was placed in the vagina and the cervix was prepped with Betadine solution.   Single tooth tenaculum placed at the 12 o'clock position.   Cervical stenosis encountered.    The cervix was dilated using minidilators.   The uterine cavity was sounded at 8 cm.   The endometrial cavity was curetted for pipelle tissue sampling, 2 passes.  Specimen was sent to pathology.   The single tooth tenaculum was removed.   Silver nitrate was applied at the site of tenaculum application   Good hemostasis was noted.  There were no complications.    There was no blood loss.      Discharge instructions were provided to the patient.    Visit Plan:  Await final pathology prior to treatment.  She would like vaginal estrogen cream if benign biopsy.

## 2024-12-23 ENCOUNTER — HOSPITAL ENCOUNTER (OUTPATIENT)
Dept: MAMMOGRAPHY | Facility: HOSPITAL | Age: 64
Discharge: HOME | End: 2024-12-23
Attending: INTERNAL MEDICINE
Payer: COMMERCIAL

## 2024-12-23 DIAGNOSIS — R92.8 ABNORMAL MAMMOGRAM OF RIGHT BREAST: ICD-10-CM

## 2024-12-23 PROCEDURE — 77061 BREAST TOMOSYNTHESIS UNI: CPT | Performed by: INTERNAL MEDICINE

## 2024-12-23 PROCEDURE — 77065 DX MAMMO INCL CAD UNI: CPT | Performed by: INTERNAL MEDICINE

## 2025-03-15 ENCOUNTER — HOSPITAL ENCOUNTER (EMERGENCY)
Facility: HOSPITAL | Age: 65
Discharge: HOME | End: 2025-03-15
Attending: EMERGENCY MEDICINE
Payer: COMMERCIAL

## 2025-03-15 ENCOUNTER — APPOINTMENT (OUTPATIENT)
Dept: GENERAL RADIOLOGY | Facility: HOSPITAL | Age: 65
End: 2025-03-15
Payer: COMMERCIAL

## 2025-03-15 VITALS
OXYGEN SATURATION: 98 % | SYSTOLIC BLOOD PRESSURE: 154 MMHG | HEART RATE: 64 BPM | DIASTOLIC BLOOD PRESSURE: 90 MMHG | RESPIRATION RATE: 17 BRPM | TEMPERATURE: 98 F

## 2025-03-15 DIAGNOSIS — R07.89 CHEST TIGHTNESS: Primary | ICD-10-CM

## 2025-03-15 LAB
ALBUMIN SERPL-MCNC: 4.3 G/DL (ref 3.2–4.8)
ALBUMIN/GLOB SERPL: 1.7 {RATIO} (ref 1–2)
ALP LIVER SERPL-CCNC: 59 U/L
ALT SERPL-CCNC: 13 U/L
ANION GAP SERPL CALC-SCNC: 9 MMOL/L (ref 0–18)
AST SERPL-CCNC: 16 U/L (ref ?–34)
BASOPHILS # BLD AUTO: 0.05 X10(3) UL (ref 0–0.2)
BASOPHILS NFR BLD AUTO: 1 %
BILIRUB SERPL-MCNC: 0.5 MG/DL (ref 0.2–1.1)
BUN BLD-MCNC: 12 MG/DL (ref 9–23)
BUN/CREAT SERPL: 16 (ref 10–20)
CALCIUM BLD-MCNC: 9.2 MG/DL (ref 8.7–10.4)
CHLORIDE SERPL-SCNC: 108 MMOL/L (ref 98–112)
CO2 SERPL-SCNC: 24 MMOL/L (ref 21–32)
CREAT BLD-MCNC: 0.75 MG/DL
DEPRECATED RDW RBC AUTO: 40.7 FL (ref 35.1–46.3)
EGFRCR SERPLBLD CKD-EPI 2021: 90 ML/MIN/1.73M2 (ref 60–?)
EOSINOPHIL # BLD AUTO: 0.11 X10(3) UL (ref 0–0.7)
EOSINOPHIL NFR BLD AUTO: 2.1 %
ERYTHROCYTE [DISTWIDTH] IN BLOOD BY AUTOMATED COUNT: 12.2 % (ref 11–15)
GLOBULIN PLAS-MCNC: 2.5 G/DL (ref 2–3.5)
GLUCOSE BLD-MCNC: 126 MG/DL (ref 70–99)
HCT VFR BLD AUTO: 39.1 %
HGB BLD-MCNC: 13.9 G/DL
IMM GRANULOCYTES # BLD AUTO: 0.01 X10(3) UL (ref 0–1)
IMM GRANULOCYTES NFR BLD: 0.2 %
LYMPHOCYTES # BLD AUTO: 1.87 X10(3) UL (ref 1–4)
LYMPHOCYTES NFR BLD AUTO: 36.2 %
MCH RBC QN AUTO: 32 PG (ref 26–34)
MCHC RBC AUTO-ENTMCNC: 35.5 G/DL (ref 31–37)
MCV RBC AUTO: 90.1 FL
MONOCYTES # BLD AUTO: 0.44 X10(3) UL (ref 0.1–1)
MONOCYTES NFR BLD AUTO: 8.5 %
NEUTROPHILS # BLD AUTO: 2.68 X10 (3) UL (ref 1.5–7.7)
NEUTROPHILS # BLD AUTO: 2.68 X10(3) UL (ref 1.5–7.7)
NEUTROPHILS NFR BLD AUTO: 52 %
OSMOLALITY SERPL CALC.SUM OF ELEC: 293 MOSM/KG (ref 275–295)
PLATELET # BLD AUTO: 331 10(3)UL (ref 150–450)
POTASSIUM SERPL-SCNC: 3.6 MMOL/L (ref 3.5–5.1)
PROT SERPL-MCNC: 6.8 G/DL (ref 5.7–8.2)
RBC # BLD AUTO: 4.34 X10(6)UL
SODIUM SERPL-SCNC: 141 MMOL/L (ref 136–145)
TROPONIN I SERPL HS-MCNC: 3 NG/L
WBC # BLD AUTO: 5.2 X10(3) UL (ref 4–11)

## 2025-03-15 PROCEDURE — 93010 ELECTROCARDIOGRAM REPORT: CPT

## 2025-03-15 PROCEDURE — 80053 COMPREHEN METABOLIC PANEL: CPT | Performed by: EMERGENCY MEDICINE

## 2025-03-15 PROCEDURE — 36415 COLL VENOUS BLD VENIPUNCTURE: CPT

## 2025-03-15 PROCEDURE — 85025 COMPLETE CBC W/AUTO DIFF WBC: CPT | Performed by: EMERGENCY MEDICINE

## 2025-03-15 PROCEDURE — 99284 EMERGENCY DEPT VISIT MOD MDM: CPT

## 2025-03-15 PROCEDURE — 71045 X-RAY EXAM CHEST 1 VIEW: CPT | Performed by: EMERGENCY MEDICINE

## 2025-03-15 PROCEDURE — 84484 ASSAY OF TROPONIN QUANT: CPT | Performed by: EMERGENCY MEDICINE

## 2025-03-15 PROCEDURE — 99285 EMERGENCY DEPT VISIT HI MDM: CPT

## 2025-03-15 PROCEDURE — 93005 ELECTROCARDIOGRAM TRACING: CPT

## 2025-03-15 RX ORDER — MULTIVIT-MIN/IRON/FOLIC ACID/K 18-600-40
1 CAPSULE ORAL DAILY
COMMUNITY

## 2025-03-15 NOTE — ED QUICK NOTES
Orders for admission, patient is aware of plan and ready to go upstairs. Any questions, please call ED RN gabriela  at extension [phone].   Chief Complaint   Patient presents with    Chest Pain       Patient AO x 3  Ambulation: ambulatory  Belongings: accompanying patient  Medications: see mar   IV: 20g lac  Language: english  COVID-19 suspicion level/status: na  CIWA SCORE: na  NIH: na  Other pertinent information:  na

## 2025-03-15 NOTE — ED QUICK NOTES
Chief Complaint   Patient presents with    Chest Pain     Patient aox3 to ed via private vehicle co of exertional lionel x few days with chest pain. Hx of high cholesterol. Denies dizziness denies cough.    Patient changed in to gown on nibp cardiac and spo2 monitors. Side railsx2 call light within reach

## 2025-03-15 NOTE — ED INITIAL ASSESSMENT (HPI)
S: Tightness in chest x 1 week with increased frequency today and recurring \"tunnel vision\"/head pressure that accompanies the pressure. No aggravating or alleviating factors. No radiation. Denies nausea vomiting or sob. Endorses dizziness but states that she also has an acoustic neuroma that could be contributing to that.

## 2025-03-15 NOTE — ED PROVIDER NOTES
Patient Seen in: [facility] Emergency Department    History     Chief Complaint   Patient presents with    Chest Pain       HPI    History is provided by patient/independent historian: patient  64 year old female with hx of acoustic neuroma, HL, here with c/o intermittent chest tightness x1 week, now more frequent and associated with tunnel vision. She has experienced exertional dyspnea on her usual walk during lunch. Her mom just had a cardiac stent placed. No radiation of the pain, no nausea/diaphoresis. No prior cardiac evaluation.     History reviewed.   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 Hope)    Meibomian gland dysfunction    Pinguecula         History reviewed.   Past Surgical History:   Procedure Laterality Date    Breast biopsy  2005    Negative    Cataract extraction w/  intraocular lens implant Right 06/18/2020    Dr. David Mascorro     Eye surgery Right 02/20/2020    Pars Plana Vitrectomy w/ laser and gas fluid exchange  2/20/20 Dr. Hope     Laser surgery of eye Right 03/30/2020    Dr. Hope- laser for RD    Apollo biopsy stereo nodule 1 site right (cpt=19081)  05/23/2024    ((Pt Qnr Sub: stereo bx 05/2024)) top hat    Apollo localization wire 1 site left (cpt=19281)  2005    Apollo localization wire 1 site right (cpt=19281)      done 8280-0268    Needle biopsy left  2009    Needle biopsy right  2003    Other surgical history  03/2015    excision 'atypical meningioma' thoracic spine    Repair retinal detach, scleral buckle - od - right eye Right 06/25/2020    Dr. Hope - with Cryo          Home  Medications reviewed :  Prescriptions Prior to Admission[1]      History reviewed.   Social History     Socioeconomic History    Marital status: Life Partner   Tobacco Use    Smoking status: Never    Smokeless tobacco: Never   Vaping Use    Vaping status: Never Used   Substance and Sexual Activity    Alcohol use: No     Alcohol/week: 0.0 standard drinks of alcohol     Comment: minimal    Drug use: No   Other Topics Concern    Caffeine Concern Yes     Comment: Coffee - 3 cups per day     Pt has a pacemaker No    Pt has a defibrillator No    Reaction to local anesthetic No         ROS  Review of Systems   Respiratory:  Positive for shortness of breath.    Cardiovascular:  Positive for chest pain.   All other systems reviewed and are negative.     All other pertinent organ systems are reviewed and are negative.      Physical Exam     ED Triage Vitals   BP 03/15/25 1531 138/87   Pulse 03/15/25 1531 75   Resp 03/15/25 1531 20   Temp 03/15/25 1531 97.7 °F (36.5 °C)   Temp src --    SpO2 03/15/25 1531 97 %   O2 Device 03/15/25 1545 None (Room air)     Vital signs reviewed.      Physical Exam  Vitals and nursing note reviewed.   Cardiovascular:      Pulses: Normal pulses.   Pulmonary:      Effort: No respiratory distress.   Chest:      Chest wall: No tenderness.   Abdominal:      General: There is no distension.   Neurological:      Mental Status: She is alert.         ED Course       Labs:     Labs Reviewed   COMP METABOLIC PANEL (14) - Abnormal; Notable for the following components:       Result Value    Glucose 126 (*)     All other components within normal limits   TROPONIN I HIGH SENSITIVITY - Normal   CBC WITH DIFFERENTIAL WITH PLATELET   RAINBOW DRAW LAVENDER   RAINBOW DRAW LIGHT GREEN   RAINBOW DRAW BLUE         My EKG Interpretation: EKG    Rate, intervals and axes as noted on EKG Report.  Rate: 76  Rhythm: Sinus Rhythm  Reading: normal for rate, normal for rhythm, mild ST depression           As reviewed and  Interpreted by me      Imaging Results Available and Reviewed while in ED:   XR CHEST AP PORTABLE  (CPT=71045)    Result Date: 3/15/2025  CONCLUSION:  1. No acute cardiopulmonary finding.    Dictated by (CST): Bola Guevara MD on 3/15/2025 at 4:16 PM     Finalized by (CST): Bola Guevara MD on 3/15/2025 at 4:17 PM         My review and independent interpretation of XR images: no infiltrate. Radiology report corroborates this in addition to other details as reported by them.      Decision rules/scores evaluated:   HEART Score for cardiac disease  H.E.A.R.T Score  ===================================  History:  + 2 Highly Suspicious  +1 Moderately Suspicious  +0 Slightly Suspicious      EKG:   +2 ST depression  +1 Non-specific   +0 Normal    64 year old  + 2   65 and over  +1  45-64  +0  44 and under    Risk factors     +2 for 3 or more  +1 for 1-2  +0 for 0  Hypercholesterolemia  Hypertension  Diabetes Mellitus  Cigarette smoking  Positive family history  Obesity    Troponin  +2 3x upper limit or more  +1 1-3x upper limit  +0  Less than or equal to upper limit  =====================================    0-3: 2.5% risk of adverse cardiac event.  4-6: 20.3% risk of adverse cardiac event, suggesting admission to the hospital  >=7: 72.7% risk of adverse cardiac event, suggesting early invasive measures        Diagnostic labs/tests considered but not ordered: none    ED Medications Administered: Medications - No data to display             MDM       Medical Decision Making      Differential Diagnosis: After obtaining the patient's history, performing the physical exam and reviewing the diagnostics, multiple initial diagnoses were considered based on the presenting problem including ACS, angina, pericarditis, anxiety, GERD, pneumonia    External document review: I personally reviewed available external medical records for any recent pertinent discharge summaries, testing, and procedures - the findings are as follows:  12/18/24 visit with Dr. 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 to contribute to the complexity of this ED evaluation.    Procedures performed: none    Discussed management with physician/appropriate source: none    Considered admission/deescalation of care for: chest pain    Social determinants of health affecting patient care: none    Prescription medications considered: discussed continuing current medication regimen    The patient requires continuous monitoring for: chest pain    Shared decision making: recommended admission, pt declining, return precautions discussed        Disposition and Plan     Clinical Impression:  1. Chest tightness        Disposition:  Discharge    Follow-up:  Annette Peñaloza MD  [address] [place] [id number]  [phone]    Follow up        Medications Prescribed:  There are no discharge medications for this patient.                     [1] (Not in a hospital admission)

## 2025-03-16 LAB
ATRIAL RATE: 76 BPM
P AXIS: 67 DEGREES
P-R INTERVAL: 160 MS
Q-T INTERVAL: 368 MS
QRS DURATION: 84 MS
QTC CALCULATION (BEZET): 414 MS
R AXIS: 11 DEGREES
T AXIS: 33 DEGREES
VENTRICULAR RATE: 76 BPM

## 2025-04-06 PROBLEM — R07.89 CHEST TIGHTNESS: Status: RESOLVED | Noted: 2025-03-15 | Resolved: 2025-04-06

## 2025-04-06 PROBLEM — N94.12 DEEP DYSPAREUNIA IN FEMALE: Status: RESOLVED | Noted: 2024-06-27 | Resolved: 2025-04-06

## 2025-04-07 ENCOUNTER — OFFICE VISIT (OUTPATIENT)
Dept: INTERNAL MEDICINE CLINIC | Facility: CLINIC | Age: 65
End: 2025-04-07
Payer: COMMERCIAL

## 2025-04-07 VITALS
DIASTOLIC BLOOD PRESSURE: 89 MMHG | OXYGEN SATURATION: 99 % | HEART RATE: 72 BPM | BODY MASS INDEX: 23.64 KG/M2 | SYSTOLIC BLOOD PRESSURE: 138 MMHG | RESPIRATION RATE: 18 BRPM | HEIGHT: 68 IN | WEIGHT: 156 LBS

## 2025-04-07 DIAGNOSIS — M54.30 SCIATICA, UNSPECIFIED LATERALITY: ICD-10-CM

## 2025-04-07 DIAGNOSIS — Z86.69 HISTORY OF RETINAL DETACHMENT: ICD-10-CM

## 2025-04-07 DIAGNOSIS — N95.2 ATROPHIC VAGINITIS: ICD-10-CM

## 2025-04-07 DIAGNOSIS — D33.3 ACOUSTIC NEUROMA (HCC): Primary | ICD-10-CM

## 2025-04-07 DIAGNOSIS — D32.1 SPINAL MENINGIOMA (HCC): ICD-10-CM

## 2025-04-07 DIAGNOSIS — H26.491 AFTER CATARACT OF RIGHT EYE NOT OBSCURING VISION: ICD-10-CM

## 2025-04-07 DIAGNOSIS — H33.009 RETINAL DETACHMENT WITH RETINAL DEFECT, UNSPECIFIED LATERALITY: Chronic | ICD-10-CM

## 2025-04-07 DIAGNOSIS — Z12.31 ENCOUNTER FOR SCREENING MAMMOGRAM FOR MALIGNANT NEOPLASM OF BREAST: ICD-10-CM

## 2025-04-07 DIAGNOSIS — G89.29 CHRONIC RIGHT SHOULDER PAIN: ICD-10-CM

## 2025-04-07 DIAGNOSIS — Z12.4 PAP SMEAR FOR CERVICAL CANCER SCREENING: ICD-10-CM

## 2025-04-07 DIAGNOSIS — Z71.85 VACCINE COUNSELING: ICD-10-CM

## 2025-04-07 DIAGNOSIS — H43.392 FLOATERS, LEFT: ICD-10-CM

## 2025-04-07 DIAGNOSIS — L40.9 PSORIASIS AND SIMILAR DISORDER: ICD-10-CM

## 2025-04-07 DIAGNOSIS — M16.12 PRIMARY OSTEOARTHRITIS OF LEFT HIP: ICD-10-CM

## 2025-04-07 DIAGNOSIS — M25.511 CHRONIC RIGHT SHOULDER PAIN: ICD-10-CM

## 2025-04-07 DIAGNOSIS — H35.371 EPIRETINAL MEMBRANE (ERM) OF RIGHT EYE: ICD-10-CM

## 2025-04-07 DIAGNOSIS — H20.811: ICD-10-CM

## 2025-04-07 DIAGNOSIS — Z96.1 PSEUDOPHAKIA, RIGHT EYE: ICD-10-CM

## 2025-04-07 DIAGNOSIS — E78.00 HIGH CHOLESTEROL: ICD-10-CM

## 2025-04-07 DIAGNOSIS — R31.21 ASYMPTOMATIC MICROSCOPIC HEMATURIA: ICD-10-CM

## 2025-04-07 DIAGNOSIS — H35.81 RETINAL EDEMA: ICD-10-CM

## 2025-04-07 DIAGNOSIS — Z00.00 ADULT GENERAL MEDICAL EXAMINATION: ICD-10-CM

## 2025-04-07 DIAGNOSIS — T85.398D EXTRUSION OF SCLERAL BUCKLE, SUBSEQUENT ENCOUNTER: ICD-10-CM

## 2025-04-07 DIAGNOSIS — H25.12 AGE-RELATED NUCLEAR CATARACT OF LEFT EYE: ICD-10-CM

## 2025-04-07 LAB
ALBUMIN SERPL-MCNC: 4.6 G/DL (ref 3.2–4.8)
ALBUMIN/GLOB SERPL: 1.8 {RATIO} (ref 1–2)
ALP LIVER SERPL-CCNC: 57 U/L
ALT SERPL-CCNC: 16 U/L
ANION GAP SERPL CALC-SCNC: 8 MMOL/L (ref 0–18)
APPEARANCE: CLEAR
AST SERPL-CCNC: 20 U/L (ref ?–34)
BASOPHILS # BLD AUTO: 0.04 X10(3) UL (ref 0–0.2)
BASOPHILS NFR BLD AUTO: 0.7 %
BILIRUB SERPL-MCNC: 0.7 MG/DL (ref 0.2–1.1)
BILIRUB UR QL: NEGATIVE
BILIRUBIN: NEGATIVE
BUN BLD-MCNC: 12 MG/DL (ref 9–23)
BUN/CREAT SERPL: 17.1 (ref 10–20)
CALCIUM BLD-MCNC: 9.7 MG/DL (ref 8.7–10.4)
CHLORIDE SERPL-SCNC: 102 MMOL/L (ref 98–112)
CHOLEST SERPL-MCNC: 271 MG/DL (ref ?–200)
CLARITY UR: CLEAR
CO2 SERPL-SCNC: 28 MMOL/L (ref 21–32)
COLOR UR: COLORLESS
CREAT BLD-MCNC: 0.7 MG/DL
DEPRECATED RDW RBC AUTO: 42.4 FL (ref 35.1–46.3)
EGFRCR SERPLBLD CKD-EPI 2021: 97 ML/MIN/1.73M2 (ref 60–?)
EOSINOPHIL # BLD AUTO: 0.03 X10(3) UL (ref 0–0.7)
EOSINOPHIL NFR BLD AUTO: 0.5 %
ERYTHROCYTE [DISTWIDTH] IN BLOOD BY AUTOMATED COUNT: 12.5 % (ref 11–15)
FASTING PATIENT LIPID ANSWER: YES
FASTING STATUS PATIENT QL REPORTED: YES
GLOBULIN PLAS-MCNC: 2.6 G/DL (ref 2–3.5)
GLUCOSE (URINE DIPSTICK): NEGATIVE MG/DL
GLUCOSE BLD-MCNC: 88 MG/DL (ref 70–99)
GLUCOSE UR-MCNC: NORMAL MG/DL
HCT VFR BLD AUTO: 42.5 %
HDLC SERPL-MCNC: 86 MG/DL (ref 40–59)
HGB BLD-MCNC: 14.7 G/DL
HGB UR QL STRIP.AUTO: NEGATIVE
IMM GRANULOCYTES # BLD AUTO: 0 X10(3) UL (ref 0–1)
IMM GRANULOCYTES NFR BLD: 0 %
KETONES (URINE DIPSTICK): NEGATIVE MG/DL
KETONES UR-MCNC: NEGATIVE MG/DL
LDLC SERPL CALC-MCNC: 174 MG/DL (ref ?–100)
LEUKOCYTE ESTERASE UR QL STRIP.AUTO: NEGATIVE
LEUKOCYTES: NEGATIVE
LYMPHOCYTES # BLD AUTO: 1.51 X10(3) UL (ref 1–4)
LYMPHOCYTES NFR BLD AUTO: 26.9 %
MCH RBC QN AUTO: 31.9 PG (ref 26–34)
MCHC RBC AUTO-ENTMCNC: 34.6 G/DL (ref 31–37)
MCV RBC AUTO: 92.2 FL
MONOCYTES # BLD AUTO: 0.3 X10(3) UL (ref 0.1–1)
MONOCYTES NFR BLD AUTO: 5.3 %
MULTISTIX EXPIRATION DATE: ABNORMAL DATE
MULTISTIX LOT#: ABNORMAL NUMERIC
NEUTROPHILS # BLD AUTO: 3.74 X10 (3) UL (ref 1.5–7.7)
NEUTROPHILS # BLD AUTO: 3.74 X10(3) UL (ref 1.5–7.7)
NEUTROPHILS NFR BLD AUTO: 66.6 %
NITRITE UR QL STRIP.AUTO: NEGATIVE
NITRITE, URINE: NEGATIVE
NONHDLC SERPL-MCNC: 185 MG/DL (ref ?–130)
OSMOLALITY SERPL CALC.SUM OF ELEC: 285 MOSM/KG (ref 275–295)
PH UR: 6.5 [PH] (ref 5–8)
PH, URINE: 6.5 (ref 4.5–8)
PLATELET # BLD AUTO: 333 10(3)UL (ref 150–450)
POTASSIUM SERPL-SCNC: 4.3 MMOL/L (ref 3.5–5.1)
PROT SERPL-MCNC: 7.2 G/DL (ref 5.7–8.2)
PROT UR-MCNC: NEGATIVE MG/DL
PROTEIN (URINE DIPSTICK): NEGATIVE MG/DL
RBC # BLD AUTO: 4.61 X10(6)UL
SODIUM SERPL-SCNC: 138 MMOL/L (ref 136–145)
SP GR UR STRIP: <1.005 (ref 1–1.03)
SPECIFIC GRAVITY: 1.01 (ref 1–1.03)
TRIGL SERPL-MCNC: 68 MG/DL (ref 30–149)
TSI SER-ACNC: 1.06 UIU/ML (ref 0.55–4.78)
URINE-COLOR: YELLOW
UROBILINOGEN UR STRIP-ACNC: NORMAL
UROBILINOGEN,SEMI-QN: 0.2 MG/DL (ref 0–1.9)
VIT D+METAB SERPL-MCNC: 37.2 NG/ML (ref 30–100)
VLDLC SERPL CALC-MCNC: 14 MG/DL (ref 0–30)
WBC # BLD AUTO: 5.6 X10(3) UL (ref 4–11)

## 2025-04-07 PROCEDURE — 3079F DIAST BP 80-89 MM HG: CPT | Performed by: INTERNAL MEDICINE

## 2025-04-07 PROCEDURE — 3075F SYST BP GE 130 - 139MM HG: CPT | Performed by: INTERNAL MEDICINE

## 2025-04-07 PROCEDURE — 99214 OFFICE O/P EST MOD 30 MIN: CPT | Performed by: INTERNAL MEDICINE

## 2025-04-07 PROCEDURE — 99396 PREV VISIT EST AGE 40-64: CPT | Performed by: INTERNAL MEDICINE

## 2025-04-07 PROCEDURE — 3008F BODY MASS INDEX DOCD: CPT | Performed by: INTERNAL MEDICINE

## 2025-04-07 PROCEDURE — 81003 URINALYSIS AUTO W/O SCOPE: CPT | Performed by: INTERNAL MEDICINE

## 2025-04-07 PROCEDURE — 99499 UNLISTED E&M SERVICE: CPT | Performed by: INTERNAL MEDICINE

## 2025-04-07 NOTE — PROGRESS NOTES
HPI:    Patient ID: Carolina Davis is a 65 year old female.    Carolina Davis is a 65 year old female who presents for a complete physical exam.   HPI:     Chief Complaint   Patient presents with    Physical     No issues        Patient's last menstrual period was 12/17/2014 (approximate).    /89 (BP Location: Right arm, Patient Position: Sitting, Cuff Size: adult)   Pulse 72   Resp 18   Ht 5' 8\" (1.727 m)   Wt 156 lb (70.8 kg)   LMP 12/17/2014 (Approximate)   SpO2 99%   BMI 23.72 kg/m²    Wt Readings from Last 4 Encounters:   04/07/25 156 lb (70.8 kg)   12/18/24 158 lb (71.7 kg)   10/22/24 159 lb 3.2 oz (72.2 kg)   06/27/24 156 lb 1.6 oz (70.8 kg)     Body mass index is 23.72 kg/m².     Labs:   Lab Results   Component Value Date/Time    WBC 5.2 03/15/2025 03:43 PM    HGB 13.9 03/15/2025 03:43 PM    .0 03/15/2025 03:43 PM      Lab Results   Component Value Date/Time     (H) 03/15/2025 03:43 PM     03/15/2025 03:43 PM    K 3.6 03/15/2025 03:43 PM     03/15/2025 03:43 PM    CO2 24.0 03/15/2025 03:43 PM    CREATSERUM 0.75 03/15/2025 03:43 PM    CA 9.2 03/15/2025 03:43 PM    ALB 4.3 03/15/2025 03:43 PM    TP 6.8 03/15/2025 03:43 PM    ALKPHO 59 03/15/2025 03:43 PM    AST 16 03/15/2025 03:43 PM    ALT 13 03/15/2025 03:43 PM    BILT 0.5 03/15/2025 03:43 PM    TSH 1.071 04/04/2024 11:44 AM        Lab Results   Component Value Date/Time    CHOLEST 282 (H) 04/04/2024 11:44 AM    HDL 99 (H) 04/04/2024 11:44 AM    TRIG 54 04/04/2024 11:44 AM     (H) 04/04/2024 11:44 AM    NONHDLC 183 (H) 04/04/2024 11:44 AM       Lab Results   Component Value Date/Time    A1C 5.5 08/14/2014 08:43 AM      No results found for: \"VITD\"      Health Maintenance   Topic Date Due    Mammogram  12/23/2025       Current Outpatient Medications   Medication Sig Dispense Refill    Calcium Carbonate Antacid 600 MG Oral Chew Tab Chew 600 mg by mouth.      Multiple Vitamins-Minerals (CENTRUM SILVER 50+WOMEN)  Oral Tab Take 1 tablet by mouth daily.      Cholecalciferol (VITAMIN D) 50 MCG (2000 UT) Oral Cap Take 1 capsule (2,000 Units total) by mouth daily.        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 Hope)    Meibomian gland dysfunction    Pinguecula      Past Surgical History:   Procedure Laterality Date    Breast biopsy  2005    Negative    Cataract extraction w/  intraocular lens implant Right 06/18/2020    Dr. David Mascorro     Cholecystectomy      Eye surgery Right 02/20/2020    Pars Plana Vitrectomy w/ laser and gas fluid exchange  2/20/20 Dr. Hope     Laser surgery of eye Right 03/30/2020    Dr. Hope- laser for RD    Apollo biopsy stereo nodule 1 site right (cpt=19081)  05/23/2024    ((Pt Qnr Sub: stereo bx 05/2024)) top hat    Apollo localization wire 1 site left (cpt=19281)  2005    Apollo localization wire 1 site right (cpt=19281)      done 4420-8981    Needle biopsy left  2009    Needle biopsy right  2003    Other surgical history  03/2015    excision 'atypical meningioma' thoracic spine    Repair retinal detach, scleral buckle - od - right eye Right 06/25/2020    Dr. Hope -SB with Cryo       Family History   Problem Relation Age of Onset    Prostate Cancer Father     Diabetes Paternal Grandmother     Cancer Paternal Grandmother         Lung. Non-smoker    Macular degeneration Neg     Glaucoma Neg     Breast Cancer Neg     Ovarian Cancer Neg     Pancreatic Cancer Neg       Social History:  Social History     Socioeconomic History    Marital status: Life Partner   Tobacco Use    Smoking status: Never    Smokeless tobacco:  Never   Vaping Use    Vaping status: Never Used   Substance and Sexual Activity    Alcohol use: No     Alcohol/week: 0.0 standard drinks of alcohol     Comment: minimal    Drug use: No   Other Topics Concern    Caffeine Concern Yes     Comment: Coffee - 3 cups per day     Pt has a pacemaker No    Pt has a defibrillator No    Reaction to local anesthetic No           GYNE HISTORY:  OB History    Para Term  AB Living   0 0 0 0 0 0   SAB IAB Ectopic Multiple Live Births   0 0 0 0 0        Hx of Pap: all Paps normal           Labs:   Lab Results   Component Value Date/Time     (H) 03/15/2025 03:43 PM     03/15/2025 03:43 PM    K 3.6 03/15/2025 03:43 PM     03/15/2025 03:43 PM    CO2 24.0 03/15/2025 03:43 PM    CREATSERUM 0.75 03/15/2025 03:43 PM    CA 9.2 03/15/2025 03:43 PM    AST 16 03/15/2025 03:43 PM    ALT 13 03/15/2025 03:43 PM    TSH 1.071 2024 11:44 AM        Lab Results   Component Value Date/Time    CHOLEST 282 (H) 2024 11:44 AM    HDL 99 (H) 2024 11:44 AM    TRIG 54 2024 11:44 AM     (H) 2024 11:44 AM    NONHDLC 183 (H) 2024 11:44 AM           Saw ENT for acoustic neuroma and had MRI and told stable. Does not want surgery. Has hearing loss. Occ. vertigo.     Vaginal irritation. Uses cream when remembers and helps. But forgets.     Shoulder Pain   The pain is present in the right shoulder. This is a new problem. The current episode started more than 1 month ago (Approximately 2 months ago.  Does weight lifting but not too heavy.  Noticed this after doing rotation exercises with shoulders without weights.). There has been no history of extremity trauma. The problem occurs constantly. The problem has been unchanged. The quality of the pain is described as aching. The pain is at a severity of 6/10. The pain is moderate. Associated symptoms include a limited range of motion and stiffness. Pertinent negatives include no fever, inability to  bear weight, itching, joint locking, joint swelling, numbness or tingling. The symptoms are aggravated by lying down and activity. She has tried acetaminophen for the symptoms. The treatment provided mild relief. Family history does not include gout or rheumatoid arthritis. There is no history of diabetes, gout or rheumatoid arthritis.         Review of Systems   Constitutional:  Positive for activity change (Due to hip pain.). Negative for appetite change, chills, diaphoresis, fatigue, fever and unexpected weight change.   HENT:  Positive for hearing loss (L hearing loss. Watching.). Negative for congestion, dental problem, drooling, ear discharge, ear pain, facial swelling, mouth sores, nosebleeds, postnasal drip, rhinorrhea, sinus pressure, sinus pain, sneezing, sore throat, tinnitus, trouble swallowing and voice change.    Eyes:  Negative for photophobia, pain, discharge, redness, itching and visual disturbance.   Respiratory:  Negative for apnea, cough, choking, chest tightness, shortness of breath, wheezing and stridor.    Cardiovascular:  Negative for chest pain, palpitations and leg swelling.   Gastrointestinal:  Negative for abdominal distention, abdominal pain, anal bleeding, blood in stool, constipation, diarrhea, nausea, rectal pain and vomiting.   Endocrine: Negative for cold intolerance, heat intolerance, polydipsia, polyphagia (Occ.) and polyuria.   Genitourinary:  Negative for decreased urine volume, difficulty urinating, dysuria, flank pain, frequency, hematuria, menstrual problem, pelvic pain, urgency, vaginal bleeding, vaginal discharge and vaginal pain.   Musculoskeletal:  Positive for back pain and stiffness. Negative for gout. Arthralgias: Much improved..  Skin:  Negative for itching and rash.   Neurological:  Positive for dizziness. Negative for tingling, tremors, seizures, syncope, facial asymmetry, speech difficulty, weakness, light-headedness, numbness and headaches.    Psychiatric/Behavioral:  Negative for agitation, behavioral problems, confusion, decreased concentration, dysphoric mood, hallucinations, self-injury, sleep disturbance and suicidal ideas. The patient is not nervous/anxious and is not hyperactive.    All other systems reviewed and are negative.        Current Outpatient Medications   Medication Sig Dispense Refill    Calcium Carbonate Antacid 600 MG Oral Chew Tab Chew 600 mg by mouth.      Multiple Vitamins-Minerals (CENTRUM SILVER 50+WOMEN) Oral Tab Take 1 tablet by mouth daily.      Cholecalciferol (VITAMIN D) 50 MCG (2000 UT) Oral Cap Take 1 capsule (2,000 Units total) by mouth daily.       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 Hope)    Meibomian gland dysfunction    Pinguecula      Past Surgical History:   Procedure Laterality Date    Breast biopsy  2005    Negative    Cataract extraction w/  intraocular lens implant Right 06/18/2020    Dr. David Mascorro     Cholecystectomy      Eye surgery Right 02/20/2020    Pars Plana Vitrectomy w/ laser and gas fluid exchange  2/20/20 Dr. Hope     Laser surgery of eye Right 03/30/2020    Dr. Hope- laser for RD    Apollo biopsy stereo nodule 1 site right (cpt=19081)  05/23/2024    ((Pt Qnr Sub: stereo bx 05/2024)) top hat    Apollo localization wire 1 site left (cpt=19281)  2005    Apollo localization wire 1 site right (cpt=19281)      done 9795-1990    Needle biopsy left  2009    Needle biopsy right  2003    Other surgical history  03/2015    excision 'atypical meningioma' thoracic spine    Repair  retinal detach, scleral buckle - od - right eye Right 06/25/2020    Dr. Hope -SB with Cryo       Family History   Problem Relation Age of Onset    Prostate Cancer Father     Diabetes Paternal Grandmother     Cancer Paternal Grandmother         Lung. Non-smoker    Macular degeneration Neg     Glaucoma Neg     Breast Cancer Neg     Ovarian Cancer Neg     Pancreatic Cancer Neg       Social History:   Social History     Socioeconomic History    Marital status: Life Partner   Tobacco Use    Smoking status: Never    Smokeless tobacco: Never   Vaping Use    Vaping status: Never Used   Substance and Sexual Activity    Alcohol use: No     Alcohol/week: 0.0 standard drinks of alcohol     Comment: minimal    Drug use: No   Other Topics Concern    Caffeine Concern Yes     Comment: Coffee - 3 cups per day     Pt has a pacemaker No    Pt has a defibrillator No    Reaction to local anesthetic No     Social Drivers of Health     Food Insecurity: No Food Insecurity (4/7/2025)    NCSS - Food Insecurity     Worried About Running Out of Food in the Last Year: No     Ran Out of Food in the Last Year: No   Transportation Needs: No Transportation Needs (4/7/2025)    NCSS - Transportation     Lack of Transportation: No   Housing Stability: Not At Risk (4/7/2025)    NCSS - Housing/Utilities     Has Housing: Yes     Worried About Losing Housing: No     Unable to Get Utilities: No        Exercise level: exercises 7 times a  week (Bike X 5 miles and takes stairs and walks 7000 steps) and has been following low salt diet.  Weight has been stable.  Wt Readings from Last 3 Encounters:   04/07/25 156 lb (70.8 kg)   12/18/24 158 lb (71.7 kg)   10/22/24 159 lb 3.2 oz (72.2 kg)     BP Readings from Last 3 Encounters:   04/07/25 138/89   03/15/25 154/90   12/18/24 131/86       Labs:   Lab Results   Component Value Date/Time     (H) 03/15/2025 03:43 PM     03/15/2025 03:43 PM    K 3.6 03/15/2025 03:43 PM     03/15/2025 03:43 PM     CO2 24.0 03/15/2025 03:43 PM    CREATSERUM 0.75 03/15/2025 03:43 PM    CA 9.2 03/15/2025 03:43 PM    AST 16 03/15/2025 03:43 PM    ALT 13 03/15/2025 03:43 PM    TSH 1.071 04/04/2024 11:44 AM        Lab Results   Component Value Date/Time    CHOLEST 282 (H) 04/04/2024 11:44 AM    HDL 99 (H) 04/04/2024 11:44 AM    TRIG 54 04/04/2024 11:44 AM     (H) 04/04/2024 11:44 AM    NONHDLC 183 (H) 04/04/2024 11:44 AM          PHYSICAL EXAM:   /89 (BP Location: Right arm, Patient Position: Sitting, Cuff Size: adult)   Pulse 72   Resp 18   Ht 5' 8\" (1.727 m)   Wt 156 lb (70.8 kg)   LMP 12/17/2014 (Approximate)   SpO2 99%   BMI 23.72 kg/m²   BP Readings from Last 3 Encounters:   04/07/25 138/89   03/15/25 154/90   12/18/24 131/86     Wt Readings from Last 3 Encounters:   04/07/25 156 lb (70.8 kg)   12/18/24 158 lb (71.7 kg)   10/22/24 159 lb 3.2 oz (72.2 kg)       Physical Exam  Vitals and nursing note reviewed.   Constitutional:       General: She is not in acute distress.     Appearance: Normal appearance. She is well-developed and well-groomed. She is not ill-appearing, toxic-appearing or diaphoretic.      Interventions: She is not intubated.  HENT:      Head: Normocephalic and atraumatic.      Right Ear: Tympanic membrane, ear canal and external ear normal. No decreased hearing noted. No laceration, drainage, swelling or tenderness. No middle ear effusion. There is no impacted cerumen. No foreign body. No mastoid tenderness. No PE tube. No hemotympanum. Tympanic membrane is not injected, scarred, perforated, erythematous, retracted or bulging. Tympanic membrane has normal mobility.      Left Ear: Tympanic membrane, ear canal and external ear normal. No decreased hearing noted. No laceration, drainage, swelling or tenderness.  No middle ear effusion. There is no impacted cerumen. No foreign body. No mastoid tenderness. No PE tube. No hemotympanum. Tympanic membrane is not injected, scarred, perforated,  erythematous, retracted or bulging. Tympanic membrane has normal mobility.      Nose:      Right Sinus: No maxillary sinus tenderness or frontal sinus tenderness.      Left Sinus: No maxillary sinus tenderness or frontal sinus tenderness.      Mouth/Throat:      Lips: Pink. No lesions.      Mouth: Mucous membranes are moist. No injury, lacerations, oral lesions or angioedema.      Dentition: No dental tenderness, gingival swelling, dental abscesses or gum lesions.      Tongue: No lesions. Tongue does not deviate from midline.      Palate: No mass and lesions.      Pharynx: Oropharynx is clear. Uvula midline. No pharyngeal swelling, oropharyngeal exudate, posterior oropharyngeal erythema, uvula swelling or postnasal drip.      Tonsils: No tonsillar exudate or tonsillar abscesses.   Eyes:      General: Lids are normal. Gaze aligned appropriately. No scleral icterus.        Right eye: No foreign body, discharge or hordeolum.         Left eye: No foreign body, discharge or hordeolum.      Extraocular Movements: Extraocular movements intact.      Right eye: Normal extraocular motion and no nystagmus.      Left eye: Normal extraocular motion and no nystagmus.      Conjunctiva/sclera: Conjunctivae normal.      Right eye: Right conjunctiva is not injected. No chemosis, exudate or hemorrhage.     Left eye: Left conjunctiva is not injected. No chemosis, exudate or hemorrhage.     Pupils: Pupils are equal, round, and reactive to light.   Neck:      Thyroid: No thyroid mass, thyromegaly or thyroid tenderness.      Vascular: Normal carotid pulses. No carotid bruit, hepatojugular reflux or JVD.      Trachea: Trachea and phonation normal. No tracheal tenderness, tracheostomy, abnormal tracheal secretions or tracheal deviation.   Cardiovascular:      Rate and Rhythm: Normal rate and regular rhythm.      Pulses: Normal pulses.           Carotid pulses are 2+ on the right side and 2+ on the left side.       Radial pulses are 2+ on  the right side and 2+ on the left side.        Dorsalis pedis pulses are 2+ on the right side and 2+ on the left side.        Posterior tibial pulses are 2+ on the right side and 2+ on the left side.      Heart sounds: Normal heart sounds, S1 normal and S2 normal.   Pulmonary:      Effort: Pulmonary effort is normal. No tachypnea, bradypnea, accessory muscle usage, prolonged expiration, respiratory distress or retractions. She is not intubated.      Breath sounds: Normal breath sounds and air entry. No stridor, decreased air movement or transmitted upper airway sounds. No decreased breath sounds, wheezing, rhonchi or rales.   Chest:      Chest wall: No tenderness.   Breasts:     Breasts are symmetrical.      Right: No swelling, bleeding, inverted nipple, mass, nipple discharge, skin change or tenderness.      Left: No swelling, bleeding, inverted nipple, mass, nipple discharge, skin change or tenderness.   Abdominal:      General: Bowel sounds are normal. There is no distension.      Palpations: Abdomen is soft. Abdomen is not rigid. There is no fluid wave, hepatomegaly, splenomegaly or mass.      Tenderness: There is no abdominal tenderness. There is no right CVA tenderness, left CVA tenderness, guarding or rebound.   Genitourinary:     Exam position: Supine.   Musculoskeletal:      Right shoulder: No swelling, deformity, effusion, laceration, tenderness, bony tenderness or crepitus. Decreased range of motion (Abduction limited to 60 degrees extension backward limited to 40 degrees.). Normal strength. Normal pulse.      Left shoulder: No swelling, deformity, effusion, laceration, tenderness, bony tenderness or crepitus. Normal range of motion. Normal strength. Normal pulse.      Right upper arm: No swelling, edema, deformity, lacerations, tenderness or bony tenderness.      Cervical back: Neck supple. No swelling, edema, deformity, erythema, signs of trauma, lacerations, rigidity, spasms, torticollis, tenderness,  bony tenderness or crepitus. No pain with movement. Normal range of motion.      Right lower leg: No edema.      Left lower leg: No edema.      Comments: Negative Hawking's test bilateral shoulders.   Lymphadenopathy:      Head:      Right side of head: No submental, submandibular, preauricular, posterior auricular or occipital adenopathy.      Left side of head: No submental, submandibular, preauricular, posterior auricular or occipital adenopathy.      Cervical: No cervical adenopathy.      Right cervical: No superficial, deep or posterior cervical adenopathy.     Left cervical: No superficial, deep or posterior cervical adenopathy.      Upper Body:      Right upper body: No supraclavicular, axillary or pectoral adenopathy.      Left upper body: No supraclavicular, axillary or pectoral adenopathy.   Skin:     General: Skin is warm and dry.      Coloration: Skin is not pale.      Findings: No erythema or rash.   Neurological:      Mental Status: She is alert and oriented to person, place, and time.      Motor: No tremor or seizure activity.   Psychiatric:         Mood and Affect: Mood normal.         Speech: Speech normal.         Behavior: Behavior normal. Behavior is cooperative.              ASSESSMENT/PLAN:     Encounter Diagnoses   Name Primary?    Acoustic neuroma (HCC) Stable. FU ENT.    Yes    Adult general medical examination Check blood and urine.        After cataract of right eye not obscuring vision Stable. Fu optho.        Age-related nuclear cataract of left eye Resolved.        Atrophic vaginitis Stable. On vaginal cream.        Encounter for screening mammogram for malignant neoplasm of breast Check mammogram after 12-23-25. Continue self breast exam every month.         Epiretinal membrane (ERM) of right eye Stable. No new vision changes. Fu optho.        Extrusion of scleral buckle, subsequent encounter Stable.        Floaters, left Stable.        Fuchs' heterochromic iridocyclitis, right Stable.         High cholesterol Check blood.        History of retinal detachment- Right eye  Stable.       Pap smear for cervical cancer screening Up to date.        Primary osteoarthritis of left hip Getting worse. Looking into Sx.        Pseudophakia, right eye Stable.        Psoriasis and similar disorder Stable.        Retinal detachment with retinal defect, unspecified laterality     Retinal edema Stable. No new vision changes.        Sciatica, unspecified laterality Improved.        Spinal meningioma (HCC) Resolved SP Sx.        Vaccine counseling Holding shingrix and PCV 20 and covid booster. Holding MMR titres.       R shoulder pain ? OA. Check Xrays. Exercise. Hold meds.     Orders Placed This Encounter   Procedures    Lipid Panel    Comp Metabolic Panel (14)    CBC With Differential With Platelet    TSH W Reflex To Free T4    Vitamin D    URINALYSIS, AUTO, W/O SCOPE       Meds This Visit:  Requested Prescriptions      No prescriptions requested or ordered in this encounter       Imaging & Referrals:  [facility] KOFI 2D+3D SCREENING BILAT (CPT=77067/59904)  XR SHOULDER, COMPLETE (MIN 2 VIEWS), RIGHT (CPT=73030)        RTC 1 yr. for physical.          [1] No Known Allergies

## 2025-04-07 NOTE — PATIENT INSTRUCTIONS
ASSESSMENT/PLAN:     Encounter Diagnoses   Name Primary?    Acoustic neuroma (HCC) Stable. FU ENT.    Yes    Adult general medical examination Check blood and urine.        After cataract of right eye not obscuring vision Stable. Fu optho.        Age-related nuclear cataract of left eye Resolved.        Atrophic vaginitis Stable. On vaginal cream.        Encounter for screening mammogram for malignant neoplasm of breast Check mammogram after 12-23-25. Continue self breast exam every month.         Epiretinal membrane (ERM) of right eye Stable. No new vision changes. Fu optho.        Extrusion of scleral buckle, subsequent encounter Stable.        Floaters, left Stable.        Fuchs' heterochromic iridocyclitis, right Stable.        High cholesterol Check blood.        History of retinal detachment- Right eye  Stable.       Pap smear for cervical cancer screening Up to date.        Primary osteoarthritis of left hip Getting worse. Looking into Sx.        Pseudophakia, right eye Stable.        Psoriasis and similar disorder Stable.        Retinal detachment with retinal defect, unspecified laterality     Retinal edema Stable.        Sciatica, unspecified laterality Improved.        Spinal meningioma (HCC) Resolved SP Sx.        Vaccine counseling Holding shingrix and PCV 20 and covid booster. Holding MMR titres.       R shoulder pain ? OA. Check Xrays. Exercise. Hold meds.   Exercises for Shoulder Flexibility: External Rotation    This stretch can help restore shoulder flexibility and relieve pain over time. When stretching, be sure to breathe deeply. Follow any special instructions from your healthcare provider or physical therapist:   a doorway. Grasp the doorjamb with the hand on the frozen side. Your arm should be bent.  With the other hand, hold the elbow on the side with the involved frozen (stiff) shoulder firmly against your body.  Standing in the same spot, rotate your body away from the doorjamb.  Stop when you feel the stretch in the shoulder. At first, try to hold the stretch for 5 seconds.  Work up to doing 3 sets of this stretch, 3 times a day. Work up to holding the stretch for 30 to 60 seconds.  Note: Keep your arms as still as you can. Over time, rotate your body a little more to enhance the stretch. But be careful not to twist your back.  Frozen shoulder is another name for adhesive capsulitis, which causes restricted movement in the shoulder. If you have frozen shoulder, this stretch may cause discomfort, especially when you first get started. A few months may pass before you achieve the results you want. But once your shoulder heals, it rarely becomes frozen again. So stick to your stretching program. If you have any questions, be sure to ask your healthcare provider.  [employer] last reviewed this educational content on 3/1/2018  © 1552-8652 [employer]. [address] [place] [address]. All rights reserved. This information is not intended as a substitute for professional medical care. Always follow your healthcare professional's instructions.        Exercises for Shoulder Flexibility: Adduction (Reaching Across)    This stretch can help restore shoulder flexibility and relieve pain over time. When stretching, be sure to breathe deeply. And follow any special instructions from your doctor or physical therapist:  Put the hand from the side you want to stretch on your opposite shoulder. Your elbow should point away from your body. Try to raise your elbow as close to shoulder height as you can.  With your other hand, push the raised elbow toward the opposite shoulder. Avoid turning your head. Stop when you feel the stretch. Try to hold the stretch for 5 seconds.  Work up to doing 3 sets of this stretch, 3 times a day. Work up to holding the stretch for 30 to 60 seconds.  Note: Be sure to push your elbow across your chest, not up toward your chin. Over time, try to push your elbow  farther across your chest to enhance the stretch.  Frozen shoulder is another name for adhesive capsulitis, which causes restricted movement in the shoulder. If you have frozen shoulder, this stretch may cause discomfort, especially when you first get started. A few months may pass before you achieve the results you want. Once your shoulder heals, it rarely becomes frozen again. So stick to your stretching program. If you have any questions, be sure to ask your doctor.  [employer] last reviewed this educational content on 3/1/2018  © 0612-1561 [employer] [address] [place] [address]. All rights reserved. This information is not intended as a substitute for professional medical care. Always follow your healthcare professional's instructions.        Exercises for Shoulder Flexibility: Back Scratch    Improving your flexibility can reduce pain. Stretching exercises also can help increase your range of pain-free motion. Breathe normally when you exercise. Try to use smooth, fluid movements. Never force a stretch.  Note: Follow any special instructions you are given. If you feel pain, stop the exercise. If the pain continues after stopping, call your healthcare provider.  Stand straight, placing the back of your hand on the side you want to stretch flat against your lower back.  Throw one end of a towel over your shoulder. Grab it behind your back with your other hand.  Pull down gently on the towel with your front arm. Let your back arm slide up as high as is comfortable. You’ll feel a stretch in your shoulder. Hold the stretch for a few seconds.  Repeat 3 to 5 times. Build up to holding each stretch for 30 to 60 seconds.  For your safety, check with your healthcare provider before starting an exercise program.  [employer] last reviewed this educational content on 3/1/2018  © 4885-3755 [employer] [address] [place] [address]. All rights reserved. This information is  not intended as a substitute for professional medical care. Always follow your healthcare professional's instructions.        Exercises for Shoulder Flexibility: Wall Walk    Improving your flexibility can reduce pain. Stretching exercises also can help increase your range of pain-free motion. Breathe normally when you exercise. Use smooth, fluid movements.  Note: Follow any special instructions you are given. If you feel pain, stop the exercise. If the pain continues after stopping, call your healthcare provider:  Stand with your shoulder about 2 feet from the wall.  Raise your arm to shoulder level and gently “walk” your fingers up the wall as high as you can.  Hold for a few seconds. Then walk your fingers back down.  Repeat 3 times. Move closer to the wall as you repeat.  Build up to holding each stretch for 30 seconds.  Caution: Do this stretch only if your healthcare provider recommends it. Don’t do it when you are first injured.  [employer] last reviewed this educational content on 3/1/2018  © 4019-2697 [employer]. [address] [place] [address]. All rights reserved. This information is not intended as a substitute for professional medical care. Always follow your healthcare professional's instructions.        Shoulder Clock Exercise    To start, stand tall with your ears, shoulders, and hips in line. Your feet should be slightly apart, positioned just under your hips. Focus your eyes directly in front of you.  this position for a few seconds before starting your exercise. This helps increase your awareness of proper posture.  Imagine that your right shoulder is the center of a clock. With the outer point of your shoulder, roll it around to slowly trace the outer edge of the clock.  Move clockwise first, then counterclockwise.  Repeat 3 to 5 times. Switch shoulders.  [employer] last reviewed this educational content on 3/1/2018  © 1239-6063 [employer]. [address] [place] [address]  [place] [address]. All rights reserved. This information is not intended as a substitute for professional medical care. Always follow your healthcare professional's instructions.        Shoulder Exercises    To start, sit in a chair with your feet flat on the floor. Your weight should be slightly forward so that you’re balanced evenly on your buttocks. Relax your shoulders and keep your head level. Avoid arching your back or rounding your shoulders. Using a chair with arms may help you keep your balance.  Raise your arms, elbows bent, to shoulder height.  Slowly move your forearms together. Hold for 5 seconds.  Return to starting position. Repeat 5 times.  [employer] last reviewed this educational content on 3/1/2018  © 9132-9205 [employer]. [address] [place] [address]. All rights reserved. This information is not intended as a substitute for professional medical care. Always follow your healthcare professional's instructions.        Shoulder Shrug Exercise    To start, sit in a chair with your feet flat on the floor. Shift your weight slightly forward to avoid rounding your back. Relax. Keep your ears, shoulders, and hips aligned:  Raise both of your shoulders as high as you can, as if you were trying to touch them to your ears. Keep your head and neck still and relaxed.  Hold for a count of 10. Release.  Repeat 5 times.  For your safety, check with your healthcare provider before starting an exercise program.    [employer] last reviewed this educational content on 11/1/2017  © 1685-6264 [employer]. [address] [place] [address]. All rights reserved. This information is not intended as a substitute for professional medical care. Always follow your healthcare professional's instructions.        Shoulder Squeeze Exercise    To start, sit in a chair with your feet flat on the floor. Shift your weight slightly forward to avoid rounding your back. Relax.  Keep your ears, shoulders, and hips aligned:  Raise your arms to shoulder height, elbows bent and palms forward.  Move your arms back, squeezing your shoulder blades together.  Hold for 10 seconds. Return to starting position.   Repeat 5 times.   For your safety, check with your healthcare provider before starting an exercise program.   [employer] last reviewed this educational content on 11/1/2017  © 6447-3285 [employer]. [address] [place] [address]. All rights reserved. This information is not intended as a substitute for professional medical care. Always follow your healthcare professional's instructions.         Orders Placed This Encounter   Procedures    Lipid Panel    Comp Metabolic Panel (14)    CBC With Differential With Platelet    TSH W Reflex To Free T4    Vitamin D    URINALYSIS, AUTO, W/O SCOPE       Meds This Visit:  Requested Prescriptions      No prescriptions requested or ordered in this encounter       Imaging & Referrals:  [facility] KOFI 2D+3D SCREENING BILAT (CPT=77067/45855)  XR SHOULDER, COMPLETE (MIN 2 VIEWS), RIGHT (CPT=73030)        RTC 1 yr. for physical.

## 2025-04-10 NOTE — PROGRESS NOTES
CBC Normal (white blood cells and red blood cells and platelets),   CMP Normal (electrolytes, sugar, kidney and liver functions),   Lipid (choilesterol) is higher.  Goal LDL should be less than 100.Careful with diet.  Avoid red meat, shellfish, pork.  Try not to gracia foods.  Lower carb diet.  Exercise is most part at least 30 minutes 3-4 times a week sustained cardiovascular aerobic exercise getting that heart rate going and keeping it going for 30 minutes at a time.  If cannot do 30 will start with 10 minutes of brisk walking is good at each week build up 5 minutes more.  Recheck lipid in 3 months.  Orders written.  Possibility of trying a low-dose statin atorvastatin 20 mg at night if okay can send to the pharmacy?  Thyroid is good.   Urine looks okay.  Urine culture shows no evidence of urinary tract infection.  Vitamin D level looks good.  Goal is between 30 and 60.

## 2025-04-25 ENCOUNTER — LAB ENCOUNTER (OUTPATIENT)
Dept: LAB | Facility: HOSPITAL | Age: 65
End: 2025-04-25
Attending: INTERNAL MEDICINE
Payer: COMMERCIAL

## 2025-04-25 DIAGNOSIS — L65.9 HAIR LOSS: Primary | ICD-10-CM

## 2025-04-25 LAB — VIT B12 SERPL-MCNC: 671 PG/ML (ref 211–911)

## 2025-04-25 PROCEDURE — 86039 ANTINUCLEAR ANTIBODIES (ANA): CPT

## 2025-04-25 PROCEDURE — 36415 COLL VENOUS BLD VENIPUNCTURE: CPT

## 2025-04-25 PROCEDURE — 82607 VITAMIN B-12: CPT

## 2025-04-25 PROCEDURE — 86038 ANTINUCLEAR ANTIBODIES: CPT

## 2025-04-29 LAB — ANA NUCLEOLAR TITR SER IF: 80 {TITER}

## 2025-04-30 LAB — NUCLEAR IGG TITR SER IF: POSITIVE {TITER}

## 2025-05-06 ENCOUNTER — LAB ENCOUNTER (OUTPATIENT)
Dept: LAB | Facility: HOSPITAL | Age: 65
End: 2025-05-06
Attending: INTERNAL MEDICINE
Payer: COMMERCIAL

## 2025-05-06 DIAGNOSIS — R76.8 ANA POSITIVE: Primary | ICD-10-CM

## 2025-05-06 LAB
C3 SERPL-MCNC: 103.5 MG/DL (ref 90–170)
C4 SERPL-MCNC: 30.9 MG/DL (ref 12–36)
CRP SERPL-MCNC: <0.4 MG/DL (ref ?–1)
RHEUMATOID FACT SERPL-ACNC: <3.5 IU/ML (ref ?–14)

## 2025-05-06 PROCEDURE — 86225 DNA ANTIBODY NATIVE: CPT

## 2025-05-06 PROCEDURE — 86235 NUCLEAR ANTIGEN ANTIBODY: CPT | Performed by: INTERNAL MEDICINE

## 2025-05-06 PROCEDURE — 86038 ANTINUCLEAR ANTIBODIES: CPT

## 2025-05-06 PROCEDURE — 86235 NUCLEAR ANTIGEN ANTIBODY: CPT

## 2025-05-06 PROCEDURE — 86431 RHEUMATOID FACTOR QUANT: CPT

## 2025-05-06 PROCEDURE — 86140 C-REACTIVE PROTEIN: CPT

## 2025-05-06 PROCEDURE — 86160 COMPLEMENT ANTIGEN: CPT

## 2025-05-06 PROCEDURE — 83516 IMMUNOASSAY NONANTIBODY: CPT

## 2025-05-06 PROCEDURE — 84165 PROTEIN E-PHORESIS SERUM: CPT

## 2025-05-06 PROCEDURE — 36415 COLL VENOUS BLD VENIPUNCTURE: CPT

## 2025-05-06 PROCEDURE — 82103 ALPHA-1-ANTITRYPSIN TOTAL: CPT

## 2025-05-08 LAB
A-1-ANTITRYPSIN: 141 MG/DL
ALBUMIN SERPL ELPH-MCNC: 4.39 G/DL (ref 3.75–5.21)
ALBUMIN/GLOB SERPL: 1.99 {RATIO} (ref 1–2)
ALPHA1 GLOB SERPL ELPH-MCNC: 0.24 G/DL (ref 0.19–0.46)
ALPHA2 GLOB SERPL ELPH-MCNC: 0.53 G/DL (ref 0.48–1.05)
ANTI-RIBOSOMAL P INTERP: NEGATIVE
ANTI-RIBOSOMAL P INTERP: NEGATIVE
ANTI-RIBOSOMAL P: <0.2 AI
ANTI-RIBOSOMAL P: <0.2 AI
B-GLOBULIN SERPL ELPH-MCNC: 0.65 G/DL (ref 0.68–1.23)
GAMMA GLOB SERPL ELPH-MCNC: 0.79 G/DL (ref 0.62–1.7)
PROT SERPL-MCNC: 6.6 G/DL (ref 5.7–8.2)

## 2025-05-09 LAB — DSDNA AB TITR SER: <10 {TITER}

## 2025-05-13 LAB
DSDNA IGG SERPL IA-ACNC: 0.9 IU/ML (ref ?–10)
ENA AB SER QL IA: 0.3 UG/L (ref ?–0.7)
ENA AB SER QL IA: NEGATIVE
ENA SS-A IGG SER IA-ACNC: <0.4 U/ML (ref ?–7)
ENA SS-B IGG SER IA-ACNC: <0.4 U/ML (ref ?–7)

## 2025-07-21 ENCOUNTER — MED REC SCAN ONLY (OUTPATIENT)
Dept: INTERNAL MEDICINE CLINIC | Facility: CLINIC | Age: 65
End: 2025-07-21

## (undated) NOTE — MR AVS SNAPSHOT
After Visit Summary   4/4/2024    Carolina Davis   MRN: [id number]           Visit Information     Date & Time  4/4/2024 11:00 AM Provider  Annette Peñaloza MD [facility] Dept. Phone  [phone]      Your Vitals Were  Most recent update: 4/4/2024 11:03 AM    BP   126/80 (BP Location: Right arm, Patient Position: Sitting, Cuff Size: adult)          Pulse   65          Temp   97.9 °F (36.6 °C) (Oral)          Ht   69\"          Wt   157 lb 6.4 oz             LMP   12/17/2014 (Approximate)    SpO2   100%    BMI   23.24 kg/m²         Allergies as of 4/4/2024  Review status set to Review Complete on 4/4/2024   No Known Allergies     Your Current Medications        Dosage    Meloxicam 15 MG Oral Tab Take 1 tablet (15 mg total) by mouth daily.    Nystatin Does not apply Powder 1 Application by Does not apply route every morning.    diazepam 5 MG Oral Tab Take 5 mg by mouth.      Diagnoses for This Visit    High cholesterol   [257469]  -  Primary  Vaccine counseling   [3091932]    Adult general medical examination   [231248]    Encounter for screening mammogram for malignant neoplasm of breast   [354001]    Psoriasis and similar disorder   [351848]    History of retinal detachment   [648907]    Age-related nuclear cataract of left eye   [275457]    Pap smear for cervical cancer screening   [287747]    Postmenopausal   [460841]    Asymptomatic microscopic hematuria   [4313999]             Follow-up    Return in about 1 year (around 4/4/2025), or if symptoms worsen or fail to improve.     We Ordered the Following     Normal Orders This Visit    CBC W/ DIFFERENTIAL [01661250 CUSTOM]     CBC With Differential With Platelet [0001742 CUSTOM]     Comp Metabolic Panel (14) [0595510 CUSTOM]     Lipid Panel [8534901 CUSTOM]     ThinPrep PAP with HPV Reflex Request B [[id number] CUSTOM]     ThinPrep PAP with HPV Reflex Request [[id number] CUSTOM]     TSH W Reflex To Free T4  [[id number] CUSTOM]     Urinalysis with Culture Reflex [[id number] CUSTOM]     URINALYSIS, AUTO, W/O SCOPE [82448 CPT(R)]     Urine Culture, Routine [3559114 CUSTOM]     Future Labs/Procedures Expected by Expires    XR DEXA BONE DENSITOMETRY (CPT=77080) [50323 CPT(R)]  4/4/2024 (Approximate) 4/2/2025    CBC With Differential With Platelet [[id number] CUSTOM]  4/5/2024 4/4/2026    Comp Metabolic Panel (14) [9116333 CUSTOM]  4/6/2024 4/4/2026    Lipid Panel [[id number] CUSTOM]  4/6/2024 4/4/2026    TSH W Reflex To Free T4 [[id number] CUSTOM]  4/6/2024 4/4/2026    Urinalysis with Culture Reflex [[id number] CUSTOM]  4/18/2024 4/4/2026    Urine Culture, Routine [[id number] CUSTOM]  4/18/2024 (Approximate) 4/5/2026    [facility] KOFI 2D+3D SCREENING BILAT (CPT=77067/62949) [COMBO CPT(R)]  4/29/2024 (Approximate) 4/3/2025      Future Appointments        Provider Department    4/7/2025 10:30 AM Annette Peñaloza [facility]      Follow-up Instructions    Return in about 1 year (around 4/4/2025), or if symptoms worsen or fail to improve.     Imaging Scheduling Instructions     Around April 4, 2024   Imaging:   XR DEXA BONE DENSITOMETRY (CPT=77080)    Instructions: To schedule a test at any [facility] call Central Scheduling at   [phone].      Around April 29, 2024   Imaging:   [facility] KOFI 2D+3D SCREENING BILAT (CPT=77067/06121)    Instructions: Your order will generate a \"Scheduling Ticket\" that will be available in AutoAlert to schedule on your own at a time most convenient to you.      If you do not have a AutoAlert Account, or if you prefer to speak with someone to schedule your appointment, please call [facility] Central Scheduling at [phone].        Instructions    ASSESSMENT/PLAN:     Encounter Diagnoses   Name Primary?    High cholesterol Check blood.    Yes    Vaccine counseling Recommend shingles vaccine.  There is 2 doses of the vaccine  by 2 months 6  months apart.  Check on insurance coverage on shingles vaccine.  May be covered better at the pharmacy.  Separate shingles vaccine from all of the vaccines by at least 1 to 2 weeks.  Discussed about side effects of vaccine. Holding for now.        Adult general medical examination Check urine.        Encounter for screening mammogram for malignant neoplasm of breast Check mammogram after 4-29-24. Continue self breast exam every month.         Psoriasis and similar disorder Stable.        History of retinal detachment     Age-related nuclear cataract of left eye FU optho.        Pap smear for cervical cancer screening Check pap and HPV.       Dyspareunia.  Discussed about options.  Try coconut oil.    Vaginal discharge.  Patient says this happened in the past and had seen GYN and pelvic ultrasound and told possibly due to post menopause.  Same as happened in the past.    Postmenopausal.  Check DEXA scan. Take calcium 600 every 12 hrs. With vitamin D 400 IU every  12 hrs. Excerise at least 30 minutes 3-4 times a week. May use calcium citrate as opposed to calcium carbonate which may be better absorbed in the setting of PPI use.   lifelong physical activity at all ages is strongly endorsed by the National Osteoporosis Foundation. Exercise recommendations generally should include weight-bearing, muscle-strengthening, and balance training exercises for 30 minutes 5 days per week or 75 minutes twice weekly, often consistent with other general health recommendations. lifelong physical activity at all ages is strongly endorsed by the National Osteoporosis Foundation.  Weight Bearing  There are two types of osteoporosis exercises that are important for building and maintaining bone density: weight-bearing and muscle-strengthening exercises.  Weight-bearing Exercises  These exercises include activities that make you move against gravity while staying upright. Weight-bearing exercises can be high-impact or  low-impact.  High-impact weight-bearing exercises help build bones and keep them strong. If you have broken a bone due to osteoporosis or are at risk of breaking a bone, you may need to avoid high-impact exercises. If you’re not sure, you should check with your healthcare provider.  Examples of high-impact weight-bearing exercises are:  Dancing   Doing high-impact aerobics   Hiking   Jogging/running   Jumping Rope   Stair climbing   Tennis  Low-impact weight-bearing exercises can also help keep bones strong and are a safe alternative if you cannot do high-impact exercises. Examples of low-impact weight-bearing exercises are:  Using elliptical training machines   Doing low-impact aerobics   Using stair-step machines   Fast walking on a treadmill or outside         Orders Placed This Encounter   Procedures    Lipid Panel    CBC With Differential With Platelet    Comp Metabolic Panel (14)    TSH W Reflex To Free T4    URINALYSIS, AUTO, W/O SCOPE    ThinPrep PAP with HPV Reflex Request B       Meds This Visit:  Requested Prescriptions      No prescriptions requested or ordered in this encounter       Imaging & Referrals:  [facility] KOFI 2D+3D SCREENING BILAT (CPT=77067/49559)  XR DEXA BONE DENSITOMETRY (CPT=77080)      RTC 1 yr. for physical.                      Did you know that [facility] [place] now offer Video Visits through [employer] for adult patients for a variety of conditions such as allergies, back pain and cold symptoms? Skip the drive and waiting room and online chat with a doctor face-to-face using your web-cam enabled computer or mobile device wherever you are. Video Visits cost $50 and can be paid hassle-free using a credit, debit, or health savings card.  Not active on [employer]? Ask us how to get signed up today!          If you receive a survey from Yonathan Sanchez, please take a few minutes to complete it and provide feedback. We strive to deliver the best patient experience and are looking for ways to  make improvements. Your feedback will help us do so. For more information on [employer], please visit [email or web address]           No text in SmartText           No text in SmartText

## (undated) NOTE — LETTER
April 27, 2021    [facility] Wendy Long, [address] [address]     Patient: Rachel Karimi   YOB: 1960   Date of Visit: 4/27/2021       Dear Dr. Wendy Long MD:    Thank you for referring Subha Michael to me for evaluation 2/20/20 Dr. Bacilio Murphy ); laser surgery of eye (Right, 03/30/2020) (Dr. Bacilio Murphy- Kyle Novak for RD);  Cataract extraction w/  intraocular lens implant (Right, 06/18/2020) (Dr. Chary Beard ); and Repair Retinal Detach, Scleral Buckle - OD - Right Eye (Right, 06/2 Dermatochalasis, Meibomian gland dysfunction    Conjunctiva/Sclera Nasal/temp pinguecula Nasal/temp pinguecula    Cornea Clear Clear    Anterior Chamber Deep and quiet Deep and quiet    Iris light blue  brown/ dia iris     Lens PC IOL with 1+ PC opacity PPVX with laser right eye  for retinal detachment and vitreous hemorrhage 2/20/20,  laser for retinal detachment right eye 3/30/20 (Dr. Juanito Block), cataract surgery right eye (Dr. Sky Arias) 6/18/20, scleral buckle right eye (Dr. Juanito Block) 6/25/20      No order

## (undated) NOTE — LETTER
2/20/2020              [address] [place] [id number]         [facility],      Dr. Lonnie Poon is on vacation. I do not know this patient.   From reviewing her chart I see that she is not on any medication, s

## (undated) NOTE — LETTER
AUTHORIZATION FOR SURGICAL OPERATION OR OTHER PROCEDURE    1. I hereby authorize Dr. Laila Tay, and [facility] staff assigned to my case to perform the following operation and/or procedure at the [facility] site:    ENDOMETRIAL BIOPSY    2.  My physician has explained the nature and purpose of the operation or other procedure, possible alternative methods of treatment, the risks involved, and the possibility of complication to me.  I acknowledge that no guarantee has been made as to the result that may be obtained.  3.  I recognize that, during the course of this operation, or other procedure, unforseen conditions may necessitate additional or different procedure than those listed above.  I, therefore, further authorize and request that the above named physician, his/her physician assistants or designees perform such procedures as are, in his/her professional opinion, necessary and desirable.  4.  Any tissue or organs removed in the operation or other procedure may be disposed of by and at the discretion of the [facility] and [facility].  5.  I understand that in the event of a medical emergency, I will be transported by local paramedics to [facility] or other hospital emergency department.  6.  I certify that I have read and fully understand the above consent to operation and/or other procedure.    7.  I acknowledge that my physician has explained sedation/analgesia administration to me including the risks and benefits.  I consent to the administration of sedation/analgesia as may be necessary or desirable in the judgement of my physician.    Witness signature: ___________________________________________________ Date:  ______/______/_____                    Time:  ________ A.M.  P.M.       Patient Name:  ______________________________________________________  (please print)      Patient signature:   ___________________________________________________             Relationship to Patient:           []  Parent    Responsible person                          []  Spouse  In case of minor or                    [] Other  _____________   Incompetent name:  __________________________________________________                               (please print)      _____________      Responsible person  In case of minor or  Incompetent signature:  _______________________________________________    Statement of Physician  My signature below affirms that prior to the time of the procedure, I have explained to the patient and/or his/her guardian, the risks and benefits involved in the proposed treatment and any reasonable alternative to the proposed treatment.  I have also explained the risks and benefits involved in the refusal of the proposed treatment and have answered the patient's questions.                        Date:  ______/______/_______  Provider                      Signature:  __________________________________________________________       Time:  ___________ A.M    P.M.

## (undated) NOTE — LETTER
[facility]  [address] [address]  Authorization for Imaging Procedure  Date of Procedure:     I hereby authorize Dr. QUINTANILLA, my physician and his/her assistants (if applicable), which may include medical students, residents, and/or fellows, to perform the following procedure and administer such anesthesia as may be determined necessary by my physician: STEREOTACTIC GUIDED BIOPSY WITH TOMOSYNTHESIS OF RIGHT BREAST WITH CLIP PLACEMENT on Carolina Davis.   2.  I recognize that during the procedure, unforeseen conditions may necessitate additional or different procedures than those listed above. I, therefore, further authorize and request that the above-named physician, assistants, or designees perform such procedures as are, in their judgment, necessary and desirable.    3.  My physician has discussed prior to my procedure the potential benefits, risks and side effects of this procedure; the likelihood of achieving goals; and potential problems that might occur during recuperation. They also discussed reasonable alternatives to the procedure, including risks, benefits, and side effects related to the alternatives and risks related to not receiving this procedure. I have had all my questions answered and I acknowledge that no guarantee has been made as to the result that may be obtained.    4.  Should the need arise during my procedure, which includes change of level of care prior to discharge, I also consent to the administration of blood and/or blood products. Further, I understand that despite careful testing and screening of blood or blood products by collecting agencies, I may still be subject to ill effects as a result of receiving a blood transfusion and/or blood products. The following are some, but not all, of the potential risks that can occur: fever and allergic reactions, hemolytic reactions, transmission of diseases such as  Hepatitis, AIDS and Cytomegalovirus (CMV) and fluid overload. In the event that I wish to have an autologous transfusion of my own blood, or a directed donor transfusion, I will discuss this with my physician.  Check only if Refusing Blood or Blood Products  I understand refusal of blood or blood products as deemed necessary by my physician may have serious consequences to my condition to include possible death. I hereby assume responsibility for my refusal and release the hospital, its personnel, and my physicians from any responsibility for the consequences of my refusal.   [  ] Patient Refuses Blood      5.  I authorize the use of any specimen, organs, tissues, body parts or foreign objects that may be removed from my body during the procedure for diagnosis, research or teaching purposes and their subsequent disposal by hospital authorities. I also authorize the release of specimen test results and/or written reports to my treating physician on the hospital medical staff or other referring or consulting physicians involved in my care, at the discretion of the Pathologist or my treating physician.    6.  I consent to the photographing or videotaping of the procedures to be performed, including appropriate portions of my body for medical, scientific, or educational purposes, provided my identity is not revealed by the pictures or by descriptive texts accompanying them. If the procedure has been photographed/videotaped, the physician will obtain the original picture, image, videotape or CD. The hospital will not be responsible for storage, release or maintenance of the picture, image, tape or CD.   7.  I consent to the presence of a  or observers in the operating room as deemed necessary by my physician or their designees.    8.  I recognize that in the event my procedure results in extended X-Ray/fluoroscopy time, I may develop a skin reaction.    9.  If I have a Do Not Attempt Resuscitation  (DNAR) order in place, that status will be suspended while in the operating room, procedural suite, and during the recovery period unless otherwise explicitly stated by me (or a person authorized to consent on my behalf). The performing physician or my attending physician will determine when the applicable recovery period ends for purposes of reinstating the DNAR order.  10.  I acknowledge that my physician has explained sedation/analgesia administration to me including the risk and benefits I consent to the administration of sedation/analgesia as may be necessary or desirable in the judgment of my physician.      I CERTIFY THAT I HAVE READ AND FULLY UNDERSTAND THE ABOVE CONSENT FOR THE PROCEDURE.   Signature of Patient: _____________________________________________________________  Responsible person in case of minor, unconscious: ____________________________________  Relationship to patient:  __________________________________________________________  Signature of Witness: _______________________________Date: _________Time: __________    Statement of Physician: My signature below affirms that prior to the time of the procedure, I have explained to the patient and/or her guardian, the risks and benefits involved in the proposed treatment and any reasonable alternative to the proposed treatment. I have also explained the risks and benefits involved in the refusal of the proposed treatment and have answered the patient's questions. If I have a significant financial interest in a co-management agreement or a significant financial interest in any product or implant, or other significant relationship used in the procedure/surgery, I have disclosed this and had a discussion with my patient.  Signature of Physician:   _________________________________Date:_____________Time:________    Patient Name: Carolina Davis : 3/16/1960  Printed: May 17, 2024   Medical Record #: [id number]